# Patient Record
Sex: MALE | Race: WHITE | NOT HISPANIC OR LATINO | Employment: OTHER | URBAN - METROPOLITAN AREA
[De-identification: names, ages, dates, MRNs, and addresses within clinical notes are randomized per-mention and may not be internally consistent; named-entity substitution may affect disease eponyms.]

---

## 2019-09-16 RX ORDER — CLONIDINE HYDROCHLORIDE 0.2 MG/1
1 TABLET ORAL 3 TIMES DAILY
COMMUNITY
Start: 2016-11-15

## 2019-09-16 RX ORDER — VALSARTAN AND HYDROCHLOROTHIAZIDE 160; 12.5 MG/1; MG/1
1 TABLET, FILM COATED ORAL 3 TIMES DAILY
COMMUNITY
Start: 2016-11-13

## 2019-09-16 RX ORDER — AMLODIPINE BESYLATE 10 MG/1
1 TABLET ORAL DAILY
COMMUNITY
Start: 2014-11-20

## 2019-09-18 ENCOUNTER — OFFICE VISIT (OUTPATIENT)
Dept: NEUROSURGERY | Facility: CLINIC | Age: 74
End: 2019-09-18

## 2019-09-18 VITALS
HEART RATE: 65 BPM | WEIGHT: 218.6 LBS | SYSTOLIC BLOOD PRESSURE: 191 MMHG | DIASTOLIC BLOOD PRESSURE: 83 MMHG | HEIGHT: 71 IN | BODY MASS INDEX: 30.6 KG/M2

## 2019-09-18 DIAGNOSIS — M43.16 SPONDYLOLISTHESIS OF LUMBAR REGION: ICD-10-CM

## 2019-09-18 DIAGNOSIS — Z98.890 HX OF MELANOMA EXCISION: ICD-10-CM

## 2019-09-18 DIAGNOSIS — M48.062 SPINAL STENOSIS, LUMBAR REGION, WITH NEUROGENIC CLAUDICATION: Primary | ICD-10-CM

## 2019-09-18 DIAGNOSIS — Z85.820 HX OF MELANOMA EXCISION: ICD-10-CM

## 2019-09-18 PROCEDURE — 99203 OFFICE O/P NEW LOW 30 MIN: CPT | Performed by: PHYSICIAN ASSISTANT

## 2019-09-18 NOTE — PROGRESS NOTES
Subjective   Patient ID: Vinicius Casas is a 74 y.o. male is here today as a self referral for back and left pain.  He denies any recent cause or injury. He has not had any recent treatments. He has recently been using CBD oil which has helped.  Today, he has no back or leg pain. He does have left leg weakness.      Back Pain   The pain is at a severity of 0/10. The patient is experiencing no pain. Associated symptoms include leg pain and weakness. Pertinent negatives include no bladder incontinence, bowel incontinence, numbness, pelvic pain, perianal numbness or tingling.   Leg Pain    The pain is present in the left leg. The pain is at a severity of 0/10. The patient is experiencing no pain. Associated symptoms include muscle weakness. Pertinent negatives include no numbness or tingling.       The following portions of the patient's history were reviewed and updated as appropriate: allergies, current medications, past family history, past medical history, past social history, past surgical history and problem list.    Review of Systems   Gastrointestinal: Negative for bowel incontinence.   Genitourinary: Negative for bladder incontinence, difficulty urinating and pelvic pain.   Musculoskeletal: Positive for back pain (left leg pain).   Neurological: Positive for weakness. Negative for tingling and numbness.   All other systems reviewed and are negative.      Objective   Physical Exam   Constitutional: He is oriented to person, place, and time. He appears well-developed and well-nourished.   HENT:   Head: Normocephalic and atraumatic.   Right Ear: External ear normal.   Left Ear: External ear normal.   Eyes: Conjunctivae and EOM are normal. Pupils are equal, round, and reactive to light. Right eye exhibits no discharge. Left eye exhibits no discharge.   Neck: Normal range of motion. Neck supple. No tracheal deviation present.   Cardiovascular: Intact distal pulses.   Pulmonary/Chest: Effort normal. No stridor. No  respiratory distress.   Musculoskeletal: Normal range of motion. He exhibits no edema, tenderness or deformity.   Neurological: He is alert and oriented to person, place, and time. He has normal strength and normal reflexes. He displays no atrophy, no tremor and normal reflexes. No cranial nerve deficit or sensory deficit. He exhibits normal muscle tone. He displays a negative Romberg sign. He displays no seizure activity. Coordination and gait normal.   No long tract signs    Very mild 5-/5 L ant tib weakness   Skin: Skin is warm and dry.   Psychiatric: He has a normal mood and affect. His behavior is normal. Judgment and thought content normal.   Nursing note and vitals reviewed.    Neurologic Exam     Mental Status   Oriented to person, place, and time.     Cranial Nerves     CN III, IV, VI   Pupils are equal, round, and reactive to light.  Extraocular motions are normal.     Motor Exam     Strength   Strength 5/5 throughout.       Assessment/Plan   Independent Review of Radiographic Studies:      Medical Decision Making:    Mr. Casas was last here in 2015.  He has a history of severe spinal stenosis at L4-L5 and to a lesser degree L3-L4 as well as a history of a spondylolisthesis at L4-L5.  At his last appointment his back and leg pain had improved significantly with the use of ibuprofen.  He recently experienced an exacerbation of back and left leg pain that was quite severe however about a month ago it resolved with an alternating course of aspirin and ibuprofen.  He has since switched to CBD oil and is off the aspirin and ibuprofen and no longer has any back or leg pain.  He had previously also been complaining of some intermittent leg numbness but that has also resolved.  He denies any bowel or bladder incontinence.  He came in today despite the fact that he has no pain because he has noticed increasing leg weakness in the left leg.  It is more of an intermittent giving out but he feels less and less  stable on the leg.  There are not any particular exacerbating or alleviating factors.  He has not had any recent imaging.  His exam does reveal very subtle left tibialis anterior weakness.    He understands that his symptoms, both the sense of leg weakness and intermittent pain, are likely secondary to the known spinal stenosis.  I explained that surgery could be considered for 2 reasons, one for severe radicular pain in the other for weakness.  His pain has thankfully resolved.  His weakness is extremely subtle but he does feel that it is progressing.  I will send him for a new MRI as well as lumbar flexion and extension x-rays and have him follow-up thereafter with Dr. Garrison.  They can discuss whether it makes sense to consider surgery now.  He will also call in the interim with any questions or concerns.  He does have a history of previous melanoma and therefore I will check the MRI with and without contrast.  Vinicius was seen today for back pain and leg pain.    Diagnoses and all orders for this visit:    Spinal stenosis, lumbar region, with neurogenic claudication  -     MRI Lumbar Spine With & Without Contrast; Future  -     XR Spine Lumbar Complete With Flex & Ext; Future    Spondylolisthesis of lumbar region  -     MRI Lumbar Spine With & Without Contrast; Future  -     XR Spine Lumbar Complete With Flex & Ext; Future    Hx of melanoma excision      Return for follow up after radiology test with Dr. Garrison to discuss surgery.

## 2019-10-02 ENCOUNTER — HOSPITAL ENCOUNTER (OUTPATIENT)
Dept: MRI IMAGING | Facility: HOSPITAL | Age: 74
Discharge: HOME | End: 2019-10-02
Admitting: PHYSICIAN ASSISTANT

## 2019-10-02 ENCOUNTER — HOSPITAL ENCOUNTER (OUTPATIENT)
Dept: GENERAL RADIOLOGY | Facility: HOSPITAL | Age: 74
Discharge: HOME | End: 2019-10-02

## 2019-10-02 DIAGNOSIS — M43.16 SPONDYLOLISTHESIS OF LUMBAR REGION: ICD-10-CM

## 2019-10-02 DIAGNOSIS — M48.062 SPINAL STENOSIS, LUMBAR REGION, WITH NEUROGENIC CLAUDICATION: ICD-10-CM

## 2019-10-02 PROCEDURE — 72114 X-RAY EXAM L-S SPINE BENDING: CPT

## 2019-10-02 PROCEDURE — 82565 ASSAY OF CREATININE: CPT

## 2019-10-02 PROCEDURE — A9577 INJ MULTIHANCE: HCPCS | Performed by: PHYSICIAN ASSISTANT

## 2019-10-02 PROCEDURE — 0 GADOBENATE DIMEGLUMINE 529 MG/ML SOLUTION: Performed by: PHYSICIAN ASSISTANT

## 2019-10-02 PROCEDURE — 72158 MRI LUMBAR SPINE W/O & W/DYE: CPT

## 2019-10-02 RX ADMIN — GADOBENATE DIMEGLUMINE 20 ML: 529 INJECTION, SOLUTION INTRAVENOUS at 21:29

## 2019-10-03 LAB — CREAT BLDA-MCNC: 1.5 MG/DL (ref 0.6–1.3)

## 2019-10-10 NOTE — PROGRESS NOTES
Subjective   Patient ID: Vinicius Casas is a 74 y.o. male is here today for follow-up to discuss lumbar MRI and plain film results    Patient was last seen 9.18.19 for left leg weakness.  Patient states that the left leg weakness is worsening.  He has not had low back, leg or numbness since he started using CBD oil in August.    Patient was last seen prior to that appointment in 2015 for leg pain that resolved with NSAIDS    It has been almost 5 years since I last saw this patient. He has known spinal stenosis at L4-L5 with a spondylolisthesis and also some at L3-L4. He used to have some pain but that is better since he has been on CBD oil. What he is most concerned about is a sense of weakness in his left leg in particular. I cannot really detect any focal motor deficits although there may be some very mild left quadriceps weakness. To be clear, he says he is not describing pain but a sense of heaviness in his legs, which is certainly possible with this degree of severe spinal stenosis. He has every really tried any therapy. I encouraged him to try that first. I am not opposed to operating on him for his current symptoms, but if we were to do that, I think it would probably require a decompression and fusion at L4-L5 and perhaps even a decompression at L3-L4 and I would like to avoid that at least initially if we can. He was a bit skeptical but I convinced him to try some therapy and he will come back and see me in 3 months. If he is not any better, we can consider surgical treatment described below.      Extremity Weakness    The pain is present in the left lower leg and left upper leg. The problem occurs constantly. The problem has been gradually worsening. The pain is at a severity of 6/10. Associated symptoms include numbness.       The following portions of the patient's history were reviewed and updated as appropriate: allergies, current medications, past family history, past medical history, past social  history, past surgical history and problem list.    Review of Systems   Musculoskeletal: Positive for extremity weakness and gait problem. Negative for arthralgias, back pain and myalgias.   Neurological: Positive for weakness and numbness.   All other systems reviewed and are negative.      Objective   Physical Exam   Constitutional: He is oriented to person, place, and time. He appears well-developed and well-nourished.   HENT:   Head: Normocephalic and atraumatic.   Eyes: Conjunctivae and EOM are normal. Pupils are equal, round, and reactive to light.   Fundoscopic exam:       The right eye shows no papilledema. The right eye shows venous pulsations.        The left eye shows no papilledema. The left eye shows venous pulsations.   Neck: Carotid bruit is not present.   Neurological: He is oriented to person, place, and time. He has a normal Finger-Nose-Finger Test and a normal Heel to Shin Test. Gait normal.   Reflex Scores:       Tricep reflexes are 2+ on the right side and 2+ on the left side.       Bicep reflexes are 2+ on the right side and 2+ on the left side.       Brachioradialis reflexes are 2+ on the right side and 2+ on the left side.       Patellar reflexes are 2+ on the right side and 2+ on the left side.       Achilles reflexes are 2+ on the right side and 2+ on the left side.  Psychiatric: His speech is normal.     Neurologic Exam     Mental Status   Oriented to person, place, and time.   Registration of memory: Good recent and remote memory.   Attention: normal. Concentration: normal.   Speech: speech is normal   Level of consciousness: alert  Knowledge: consistent with education.     Cranial Nerves     CN II   Visual fields full to confrontation.   Visual acuity: normal    CN III, IV, VI   Pupils are equal, round, and reactive to light.  Extraocular motions are normal.     CN V   Facial sensation intact.   Right corneal reflex: normal  Left corneal reflex: normal    CN VII   Facial expression full,  symmetric.   Right facial weakness: none  Left facial weakness: none    CN VIII   Hearing: intact    CN IX, X   Palate: symmetric    CN XI   Right sternocleidomastoid strength: normal  Left sternocleidomastoid strength: normal    CN XII   Tongue: not atrophic  Tongue deviation: none    Motor Exam   Muscle bulk: normal  Right arm tone: normal  Left arm tone: normal  Right leg tone: normal  Left leg tone: normal    Strength   Strength 5/5 except as noted.     Sensory Exam   Light touch normal.     Gait, Coordination, and Reflexes     Gait  Gait: normal    Coordination   Finger to nose coordination: normal  Heel to shin coordination: normal    Reflexes   Right brachioradialis: 2+  Left brachioradialis: 2+  Right biceps: 2+  Left biceps: 2+  Right triceps: 2+  Left triceps: 2+  Right patellar: 2+  Left patellar: 2+  Right achilles: 2+  Left achilles: 2+  Right : 2+  Left : 2+      Assessment/Plan   Independent Review of Radiographic Studies:    I reviewed the new MRI done recently which did show severe spinal stenosis at L4-L5 with a spondylolisthesis and mild degree of stenosis at L3-L4.  Agree with the report.      Medical Decision Making:    I convinced him to try some therapy at least for a few weeks and to come and see me in 3 months.  If there is actually no progress made we can talk about surgery but he would probably be both decompressed and fused at L4-L5.      Vinicius was seen today for extremity weakness.    Diagnoses and all orders for this visit:    Spinal stenosis, lumbar region, with neurogenic claudication  -     Ambulatory Referral to Physical Therapy Evaluate and treat    Spondylolisthesis of lumbar region  -     Ambulatory Referral to Physical Therapy Evaluate and treat    Left leg weakness  -     Ambulatory Referral to Physical Therapy Evaluate and treat      Return in about 3 months (around 1/23/2020).

## 2019-10-23 ENCOUNTER — OFFICE VISIT (OUTPATIENT)
Dept: NEUROSURGERY | Facility: CLINIC | Age: 74
End: 2019-10-23

## 2019-10-23 VITALS
HEIGHT: 71 IN | DIASTOLIC BLOOD PRESSURE: 78 MMHG | BODY MASS INDEX: 30.52 KG/M2 | HEART RATE: 74 BPM | SYSTOLIC BLOOD PRESSURE: 168 MMHG

## 2019-10-23 DIAGNOSIS — M43.16 SPONDYLOLISTHESIS OF LUMBAR REGION: ICD-10-CM

## 2019-10-23 DIAGNOSIS — M48.062 SPINAL STENOSIS, LUMBAR REGION, WITH NEUROGENIC CLAUDICATION: Primary | ICD-10-CM

## 2019-10-23 DIAGNOSIS — R29.898 LEFT LEG WEAKNESS: ICD-10-CM

## 2019-10-23 PROCEDURE — 99214 OFFICE O/P EST MOD 30 MIN: CPT | Performed by: NEUROLOGICAL SURGERY

## 2019-11-07 ENCOUNTER — HOSPITAL ENCOUNTER (OUTPATIENT)
Dept: PHYSICAL THERAPY | Facility: HOSPITAL | Age: 74
Setting detail: THERAPIES SERIES
Discharge: HOME | End: 2019-11-07

## 2019-11-07 DIAGNOSIS — R29.898 LEFT LEG WEAKNESS: ICD-10-CM

## 2019-11-07 DIAGNOSIS — M48.062 SPINAL STENOSIS, LUMBAR REGION, WITH NEUROGENIC CLAUDICATION: Primary | ICD-10-CM

## 2019-11-07 DIAGNOSIS — M54.50 CHRONIC BILATERAL LOW BACK PAIN, UNSPECIFIED WHETHER SCIATICA PRESENT: ICD-10-CM

## 2019-11-07 DIAGNOSIS — G89.29 CHRONIC BILATERAL LOW BACK PAIN, UNSPECIFIED WHETHER SCIATICA PRESENT: ICD-10-CM

## 2019-11-07 DIAGNOSIS — R53.1 WEAKNESS: ICD-10-CM

## 2019-11-07 PROCEDURE — 97161 PT EVAL LOW COMPLEX 20 MIN: CPT

## 2019-11-07 PROCEDURE — 97110 THERAPEUTIC EXERCISES: CPT

## 2019-11-07 NOTE — THERAPY EVALUATION
Outpatient Physical Therapy Ortho Initial Evaluation  [facility]     Patient Name: Vinicius Casas  : 1945  MRN: [id number]  Today's Date: 2019      Visit Date: 2019    Patient Active Problem List   Diagnosis   • Spinal stenosis, lumbar region, with neurogenic claudication   • Spondylolisthesis of lumbar region   • Hx of melanoma excision   • Left leg weakness        Past Medical History:   Diagnosis Date   • High blood pressure    • Melanoma (CMS/HCC)         No past surgical history on file.    Visit Dx:     ICD-10-CM ICD-9-CM   1. Spinal stenosis, lumbar region, with neurogenic claudication M48.062 724.03   2. Chronic bilateral low back pain, unspecified whether sciatica present M54.5 724.2    G89.29 338.29   3. Weakness R53.1 780.79   4. Left leg weakness R29.898 729.89         Patient History     Row Name 19 1500             History    Chief Complaint  Muscle weakness  -      Type of Pain  Lower Extremity / Leg  -      Date Current Problem(s) Began  -- 15 years w/ gradual worsening  -      Brief Description of Current Complaint  Pt reports he hurt his lumbar spine 15+ years ago leading to B leg pain. States he has been diagnosed with spondylolisthesis and spinal stenosis. He was in a lot of pain back in August of this year in the low back and the legs however began taking CBD oil and feels like it has helped his pain substantially. At this time his major complaint is occasional L LE weakness though states it has become more and more rare. Would like to work on working on decompressing spine, improving core strength, and improving LE strength to see if he can avoid surgery.   -      Previous treatment for THIS PROBLEM  Chiropractor;Medication  -      Patient/Caregiver Goals  Improve mobility;Improve strength;Know what to do to help the symptoms  -      Patient's Rating of General Health  Good  -      What clinical tests have you had for this problem?  X-ray;MRI  -       Results of Clinical Tests  stenosis, spondylolisthesis  -         Pain     Pain Location  Back  -      Pain at Present  0  -      Pain at Best  0  -      Pain at Worst  7 if does not take CBD oil  -      Pain Frequency  Intermittent  -      Pain Description  Burning;Numbness  -      What Performance Factors Make the Current Problem(s) WORSE?  standing, walking if not taking CBD oil  -      What Performance Factors Make the Current Problem(s) BETTER?  sitting, CBD oil  -      Is your sleep disturbed?  No  -      Is medication used to assist with sleep?  No  -      Difficulties at work?  retired  -      Difficulties with ADL's?  standing, walking  -         Fall Risk Assessment    Any falls in the past year:  No  -         Daily Activities    Primary Language  English  -      How does patient learn best?  Listening;Reading;Demonstration;Pictures/Video  -      Teaching needs identified  Home Exercise Program;Management of Condition;Falls Prevention;Home Safety  -      Pt Participated in POC and Goals  Yes  -         Safety    Are you being hurt, hit, or frightened by anyone at home or in your life?  No  -JH      Are you being neglected by a caregiver  No  -        User Key  (r) = Recorded By, (t) = Taken By, (c) = Cosigned By    Initials Name Provider Type     Domonique Pruitt, PT Physical Therapist          PT Ortho     Row Name 11/07/19 1500       Subjective Pain    Able to rate subjective pain?  yes  -    Pre-Treatment Pain Level  0  -JH    Post-Treatment Pain Level  0  -JH       Posture/Observations    Posture/Observations Comments  Increased abdominal weight, relative anterior weight shift at pelvis  -       General ROM    Head/Neck/Trunk  Trunk Extension;Trunk Flexion;Trunk Lt Lateral Flexion;Trunk Rt Lateral Flexion;Trunk Lt Rotation;Trunk Rt Rotation  -       Head/Neck/Trunk    Trunk Extension AROM  50%  -    Trunk Flexion AROM  90%  -    Trunk Lt Lateral  Flexion AROM  75%  -JH    Trunk Rt Lateral Flexion AROM  75%  -JH    Trunk Lt Rotation AROM  75%  -JH    Trunk Rt Rotation AROM  75%  -JH    Head/Neck/Trunk Comments  no pain any direction  -JH       MMT (Manual Muscle Testing)    Rt Lower Ext  Rt Hip Flexion;Rt Hip Extension;Rt Hip ABduction;Rt Hip Internal (Medial) Rotation;Rt Hip External (Lateral) Rotation;Rt Knee Extension;Rt Knee Flexion;Rt Ankle Dorsiflexion;Rt Ankle Plantarflexion  -JH    Lt Lower Ext  Lt Hip Flexion;Lt Hip Extension;Lt Hip ABduction;Lt Hip Internal (Medial) Rotation;Lt Hip External (Lateral) Rotation;Lt Knee Extension;Lt Knee Flexion;Lt Ankle Dorsiflexion;Lt Ankle Plantarflexion  -JH       MMT Right Lower Ext    Rt Hip Flexion MMT, Gross Movement  (4+/5) good plus  -JH    Rt Hip Extension MMT, Gross Movement  (3+/5) fair plus  -JH    Rt Hip ABduction MMT, Gross Movement  (4-/5) good minus  -JH    Rt Hip Internal (Medial) Rotation MMT, Gross Movement  (4+/5) good plus  -JH    Rt Hip External (Lateral) Rotation MMT, Gross Movement  (4+/5) good plus  -JH    Rt Knee Extension MMT, Gross Movement  (4+/5) good plus  -JH    Rt Knee Flexion MMT, Gross Movement  (4+/5) good plus  -JH    Rt Ankle Plantarflexion MMT, Gross Movement  (3+/5) fair plus  -JH    Rt Ankle Dorsiflexion MMT, Gross Movement  (4/5) good  -JH       MMT Left Lower Ext    Lt Hip Flexion MMT, Gross Movement  (4+/5) good plus  -JH    Lt Hip Extension MMT, Gross Movement  (3+/5) fair plus  -JH    Lt Hip ABduction MMT, Gross Movement  (4-/5) good minus  -JH    Lt Hip Internal (Medial) Rotation MMT, Gross Movement  (4+/5) good plus  -JH    Lt Hip External (Lateral) Rotation MMT, Gross Movement  (4+/5) good plus  -JH    Lt Knee Extension MMT, Gross Movement  (4+/5) good plus  -JH    Lt Knee Flexion MMT, Gross Movement  (4+/5) good plus  -JH    Lt Ankle Plantarflexion MMT, Gross Movement  (3+/5) fair plus  -JH    Lt Ankle Dorsiflexion MMT, Gross Movement  (4/5) good  -JH       Sensation     Sensation WNL?  WNL  -       Balance Skills Training    SLS  R LE 10 seconds, L LE 3 seconds  -      User Key  (r) = Recorded By, (t) = Taken By, (c) = Cosigned By    Initials Name Provider Type    Domonique Walton, PT Physical Therapist                      Therapy Education  Education Details: role of OP PT, nature of condition, anatomy, HEP w/ expected response to exercise  Given: HEP, Symptoms/condition management, Pain management, Posture/body mechanics, Mobility training  Program: New  How Provided: Verbal, Written, Demonstration  Provided to: Patient  Level of Understanding: Teach back education performed, Verbalized, Demonstrated     PT OP Goals     Row Name 11/07/19 1600          PT Short Term Goals    STG Date to Achieve  12/07/19  -     STG 1  Patient will be independent with education for symptom management and initial HEP  -     STG 1 Progress  New  [facility]     STG 2  Pt will demonstrate improved resting standing posture, no longer resting in anteriorly shifted pelvis to reduce stress on lumbar spine  -     STG 2 Progress  [facility]     STG 3  Pt will cont to report pain levels <2/10 throughout treatment  -     STG 3 Progress  [facility]        Long Term Goals    LTG Date to Achieve  01/06/20  -     LTG 1  Pt will be independent w/ advanced HEP to maintain improved postures and reduce liklihood of re-irritation of tissue.  -     LTG 1 Progress  New  [facility]     LTG 2  Pt will improve B LE strength to at least 5-/5.  -     LTG 2 Progress  New  [facility]     LTG 3  Pt will demonstrate improve SLS balance Bilaterally to 15 seconds  -     LTG 3 Progress  New  [facility]     LTG 4  Pt will demo improvement in modified oswestry score from 46% disability to 35% to demonstrate improvement in functional capacity  -     LTG 4 Progress  New  [facility]     LTG 5  --  -     LT 5 Progress  --  -        Time Calculation    PT Goal Re-Cert Due Date  02/05/20  -       User Key  (r) = Recorded By, (t) = Taken By,  (c) = Cosigned By    Initials Name Provider Type    Domonique Walton, PT Physical Therapist          PT Assessment/Plan     Row Name 11/07/19 2914          PT Assessment    Functional Limitations  Limitations in functional capacity and performance;Performance in leisure activities;Limitation in home management;Limitations in community activities;Impaired gait  -     Impairments  Balance;Gait;Pain;Joint mobility;Range of motion;Muscle strength;Posture;Poor body mechanics  -     Assessment Comments  Vinicius Casas is a 74 y.o. male referred to physical therapy for spinal stenosis of the lumbar spine. He presents with a stable clinical presentation, along with no remarkable comorbidities or personal factors that may impact his progress in the plan of care. Pt presents today with LE weakness, reduced single leg balance L>R, poor postural awareness with anteriorly shifted weight increased extension on lumbar region, and reduced lumbar and thoracic mobility . his signs and symptoms are consistent with referring diagnosis. The previous impairments limit his ability to walk, or stand w/o increased pain or weakness if he does not take medication first. Pt will benefit from skilled PT to address the previous impairments and return to [facility].  -     Please refer to paper survey for additional self-reported information  Yes  -     Rehab Potential  Good  -     Patient/caregiver participated in establishment of treatment plan and goals  Yes  -     Patient would benefit from skilled therapy intervention  Yes  -        PT Plan    PT Frequency  2x/week  -     Predicted Duration of Therapy Intervention (Therapy Eval)  4-6 wks  -     Planned CPT's?  PT EVAL LOW COMPLEXITY: 16740;PT RE-EVAL: 64108;PT THER ACT EA 15 MIN: 53953;PT NEUROMUSC RE-EDUCATION EA 15 MIN: 16028;PT ELECTRICAL STIM UNATTEND: ;PT TRACTION LUMBAR: 23467;PT HOT OR COLD PACK TREAT [facility]ARE;PT GAIT TRAINING EA 15 MIN: 72625;PT MANUAL THERAPY EA  15 MIN: 75402;PT THER PROC EA 15 MIN: 95810  -     Physical Therapy Interventions (Optional Details)  balance training;gait training;gross motor skills;home exercise program;joint mobilization;lumbar stabilization;neuromuscular re-education;motor coordination training;modalities;manual therapy techniques;patient/family education;postural re-education;ROM (Range of Motion);strengthening;stretching;transfer training;dry needling  -     PT Plan Comments  assess response and compliance to HEP, progress core/hip strengthening as hiro, encourage posterior weight shift in posture to reduce stress to lumbar spine  -       User Key  (r) = Recorded By, (t) = Taken By, (c) = Cosigned By    Initials Name Provider Type     Domonique Pruitt, PT Physical Therapist            OP Exercises     Row Name 11/07/19 1500             Subjective Pain    Able to rate subjective pain?  yes  -      Pre-Treatment Pain Level  0  -JH      Post-Treatment Pain Level  0  -         Total Minutes    87900 - PT Therapeutic Exercise Minutes  9  -JH         Exercise 1    Exercise Name 1  ppt  -      Reps 1  10  -JH      Time 1  5  -JH         Exercise 2    Exercise Name 2  SKTC w/ opp leg extended  -      Reps 2  3  -JH      Time 2  20 B  -         Exercise 3    Exercise Name 3  consider posterior weight shifting balance activity, DKTC with ball, HS stretch, hip flexor stretch, nustep, clamshells, ppt w/ small bridge to tolerance  -        User Key  (r) = Recorded By, (t) = Taken By, (c) = Cosigned By    Initials Name Provider Type     Domonique Pruitt, PT Physical Therapist                        Outcome Measure Options: Modifed Owestry  Modified Oswestry  Modified Oswestry Score/Comments: 46% disability      Time Calculation:     Start Time: 1530  Stop Time: 1615  Time Calculation (min): 45 min     Therapy Charges for Today     Code Description Service Date Service Provider Modifiers Qty    [id number]  PT EVAL LOW COMPLEXITY 2  11/7/2019 Domonique Pruitt, PT GP 1    [id number] HC PT THER PROC EA 15 MIN 11/7/2019 Domonique Pruitt, PT GP 1          PT G-Codes  Outcome Measure Options: Modifed Daniely  Modified Oswestry Score/Comments: 46% disability         Domonique Pruitt, PT  11/7/2019

## 2019-11-11 ENCOUNTER — HOSPITAL ENCOUNTER (OUTPATIENT)
Dept: PHYSICAL THERAPY | Facility: HOSPITAL | Age: 74
Setting detail: THERAPIES SERIES
Discharge: HOME | End: 2019-11-11

## 2019-11-11 DIAGNOSIS — M54.50 CHRONIC BILATERAL LOW BACK PAIN, UNSPECIFIED WHETHER SCIATICA PRESENT: ICD-10-CM

## 2019-11-11 DIAGNOSIS — M48.062 SPINAL STENOSIS, LUMBAR REGION, WITH NEUROGENIC CLAUDICATION: Primary | ICD-10-CM

## 2019-11-11 DIAGNOSIS — R53.1 WEAKNESS: ICD-10-CM

## 2019-11-11 DIAGNOSIS — G89.29 CHRONIC BILATERAL LOW BACK PAIN, UNSPECIFIED WHETHER SCIATICA PRESENT: ICD-10-CM

## 2019-11-11 DIAGNOSIS — R29.898 LEFT LEG WEAKNESS: ICD-10-CM

## 2019-11-11 PROCEDURE — 97110 THERAPEUTIC EXERCISES: CPT | Performed by: PHYSICAL THERAPIST

## 2019-11-11 NOTE — THERAPY TREATMENT NOTE
Outpatient Physical Therapy Ortho Treatment Note  [facility]     Patient Name: Vinicius Casas  : 1945  MRN: [id number]  Today's Date: 2019      Visit Date: 2019    Visit Dx:    ICD-10-CM ICD-9-CM   1. Spinal stenosis, lumbar region, with neurogenic claudication M48.062 724.03   2. Chronic bilateral low back pain, unspecified whether sciatica present M54.5 724.2    G89.29 338.29   3. Weakness R53.1 780.79   4. Left leg weakness R29.898 729.89       Patient Active Problem List   Diagnosis   • Spinal stenosis, lumbar region, with neurogenic claudication   • Spondylolisthesis of lumbar region   • Hx of melanoma excision   • Left leg weakness        Past Medical History:   Diagnosis Date   • High blood pressure    • Melanoma (CMS/HCC)         No past surgical history on file.                    PT Assessment/Plan     Row Name 19 1630          PT Assessment    Assessment Comments  First session since initial evaluation. Reports compliance with SKTC stretch. Added core stabilization exercises and LE stretching exercises. Verbal and tactile cueing to ensure proper understanding. Handouts given to add to HEP. Will benefit from progressive LE strengthening as well in future sessions.    -        PT Plan    PT Plan Comments  add LAQ, HS curls and/or leg press in future sessions  -       User Key  (r) = Recorded By, (t) = Taken By, (c) = Cosigned By    Initials Name Provider Type    Isaiah Daniels, PT Physical Therapist            OP Exercises     Row Name 19 1400             Subjective Comments    Subjective Comments  No change since initial evaluation. Pain in L thigh, inside.  -         Subjective Pain    Able to rate subjective pain?  yes  -      Pre-Treatment Pain Level  3  -      Post-Treatment Pain Level  3  -         Total Minutes    05641 - PT Therapeutic Exercise Minutes  42  -         Exercise 1    Exercise Name 1  Nustep, L4  -      Time 1  5 minutes  -       Additional Comments  BLE only  -CJ         Exercise 2    Exercise Name 2  PPT with TA  -CJ      Reps 2  15  -CJ      Time 2  5 sec  -CJ         Exercise 3    Exercise Name 3  calf stretch  -CJ      Reps 3  3  -CJ      Time 3  20 sec  -CJ         Exercise 4    Exercise Name 4  B Hamstring stretch  -CJ      Reps 4  2  -CJ      Time 4  30sec  -CJ         Exercise 5    Exercise Name 5  H/L hip abduction  -CJ      Reps 5  20  -CJ      Additional Comments  RTB  -CJ         Exercise 6    Exercise Name 6  LTR with TA; 10-2  -CJ      Reps 6  15  -CJ         Exercise 7    Exercise Name 7  Bridges  -CJ      Reps 7  12  -CJ      Additional Comments  slow and controlled  -CJ         Exercise 8    Exercise Name 8  SKTC  -CJ      Reps 8  2  -CJ      Time 8  30sec  -CJ         Exercise 9    Exercise Name 9  DKTC with TA and green ball  -CJ      Reps 9  15  -CJ         Exercise 10    Exercise Name 10  B piriformis stretch  -CJ      Reps 10  2  -CJ      Time 10  30 SEC  -CJ        User Key  (r) = Recorded By, (t) = Taken By, (c) = Cosigned By    Initials Name Provider Type     Isaiah Calderón, PT Physical Therapist                       PT OP Goals     Row Name 11/11/19 1600          PT Short Term Goals    STG Date to Achieve  12/07/19  -     STG 1  Patient will be independent with education for symptom management and initial HEP  -     STG 1 Progress  Ongoing  -     STG 2  Pt will demonstrate improved resting standing posture, no longer resting in anteriorly shifted pelvis to reduce stress on lumbar spine  -     STG 2 Progress  Ongoing  -     STG 3  Pt will cont to report pain levels <2/10 throughout treatment  -     STG 3 Progress  Ongoing  -        Long Term Goals    LTG Date to Achieve  01/06/20  -     LTG 1  Pt will be independent w/ advanced HEP to maintain improved postures and reduce liklihood of re-irritation of tissue.  -     LTG 1 Progress  Ongoing  -     LTG 2  Pt will improve B LE strength to at  least 5-/5.  -     LTG 2 Progress  Ongoing  -     LTG 3  Pt will demonstrate improve SLS balance Bilaterally to 15 seconds  -     LTG 3 Progress  Ongoing  -     LTG 4  Pt will demo improvement in modified oswestry score from 46% disability to 35% to demonstrate improvement in functional capacity  -     LTG 4 Progress  Ongoing  -       User Key  (r) = Recorded By, (t) = Taken By, (c) = Cosigned By    Initials Name Provider Type    Isaiah Daniels, PT Physical Therapist                         Time Calculation:   Start Time: 1448  Stop Time: 1530  Time Calculation (min): 42 min  Therapy Charges for Today     Code Description Service Date Service Provider Modifiers Qty    19529374777 HC PT THER PROC EA 15 MIN 11/11/2019 Isaiah Calderón, PT GP 3                    Isaiah Calderón PT  11/11/2019

## 2019-11-13 ENCOUNTER — HOSPITAL ENCOUNTER (OUTPATIENT)
Dept: PHYSICAL THERAPY | Facility: HOSPITAL | Age: 74
Setting detail: THERAPIES SERIES
Discharge: HOME | End: 2019-11-13

## 2019-11-13 DIAGNOSIS — G89.29 CHRONIC BILATERAL LOW BACK PAIN, UNSPECIFIED WHETHER SCIATICA PRESENT: ICD-10-CM

## 2019-11-13 DIAGNOSIS — M48.062 SPINAL STENOSIS, LUMBAR REGION, WITH NEUROGENIC CLAUDICATION: Primary | ICD-10-CM

## 2019-11-13 DIAGNOSIS — R53.1 WEAKNESS: ICD-10-CM

## 2019-11-13 DIAGNOSIS — R29.898 LEFT LEG WEAKNESS: ICD-10-CM

## 2019-11-13 DIAGNOSIS — M54.50 CHRONIC BILATERAL LOW BACK PAIN, UNSPECIFIED WHETHER SCIATICA PRESENT: ICD-10-CM

## 2019-11-13 PROCEDURE — 97110 THERAPEUTIC EXERCISES: CPT | Performed by: PHYSICAL THERAPIST

## 2019-11-13 NOTE — THERAPY TREATMENT NOTE
Outpatient Physical Therapy Ortho Treatment Note  [facility]     Patient Name: Vinicius Casas  : 1945  MRN: [id number]  Today's Date: 2019      Visit Date: 2019    Visit Dx:    ICD-10-CM ICD-9-CM   1. Spinal stenosis, lumbar region, with neurogenic claudication M48.062 724.03   2. Chronic bilateral low back pain, unspecified whether sciatica present M54.5 724.2    G89.29 338.29   3. Weakness R53.1 780.79   4. Left leg weakness R29.898 729.89       Patient Active Problem List   Diagnosis   • Spinal stenosis, lumbar region, with neurogenic claudication   • Spondylolisthesis of lumbar region   • Hx of melanoma excision   • Left leg weakness        Past Medical History:   Diagnosis Date   • High blood pressure    • Melanoma (CMS/HCC)         No past surgical history on file.                    PT Assessment/Plan     Row Name 19 1620          PT Assessment    Assessment Comments  Continued with strengthening program adding calf raises, HS curls, LAQ, and standing hip abduction. No immediate increase in pain/discomfort reported. Fatigue noted with session today. Will monitor symptoms and progress accordingly in future sessions.   -CJ        PT Plan    PT Plan Comments  add leg press in future sessions  -       User Key  (r) = Recorded By, (t) = Taken By, (c) = Cosigned By    Initials Name Provider Type    Isaiah Daniels, PT Physical Therapist            OP Exercises     Row Name 19 1400             Subjective Comments    Subjective Comments  Stiff this AM. L leg not working well yesterday  -         Subjective Pain    Able to rate subjective pain?  yes  -CJ      Pre-Treatment Pain Level  3  -CJ      Post-Treatment Pain Level  3  -         Total Minutes    79506 - PT Therapeutic Exercise Minutes  45  -CJ         Exercise 1    Exercise Name 1  Nustep, L4  -CJ      Time 1  5 minutes  -      Additional Comments  BLE only  -         Exercise 2    Exercise Name 2  PPT with  TA  -CJ      Reps 2  15  -CJ      Time 2  5 sec  -CJ         Exercise 3    Exercise Name 3  calf stretch  -CJ      Reps 3  3  -CJ      Time 3  30 sec  -CJ         Exercise 4    Exercise Name 4  B Hamstring stretch  -CJ      Reps 4  2  -CJ      Time 4  30sec  -CJ         Exercise 5    Exercise Name 5  H/L hip abduction  -CJ      Reps 5  20  -CJ         Exercise 6    Exercise Name 6  LTR with TA; 10-2  -CJ      Reps 6  15  -CJ      Additional Comments  BLE over therapy ball  -CJ         Exercise 7    Exercise Name 7  Bridges  -CJ      Reps 7  15  -CJ      Additional Comments  slow and controlled  -CJ         Exercise 8    Exercise Name 8  SKTC  -CJ      Reps 8  2  -CJ      Time 8  30sec  -CJ         Exercise 9    Exercise Name 9  DKTC with TA and green ball  -CJ      Reps 9  15  -CJ         Exercise 10    Exercise Name 10  B piriformis stretch  -CJ      Reps 10  2  -CJ      Time 10  30 SEC  -CJ         Exercise 11    Exercise Name 11  calf raises  -CJ      Reps 11  20  -CJ         Exercise 12    Exercise Name 12  B LAQ, #2  -CJ      Reps 12  15  -CJ         Exercise 13    Exercise Name 13  B HS curls, #2  -CJ      Reps 13  20  -CJ         Exercise 14    Exercise Name 14  B hip abduction  -CJ      Reps 14  15  -CJ      Additional Comments  #2  -CJ        User Key  (r) = Recorded By, (t) = Taken By, (c) = Cosigned By    Initials Name Provider Type    CJ Isaiah Calderón, PT Physical Therapist                                          Time Calculation:   Start Time: 1400  Stop Time: 1445  Time Calculation (min): 45 min  Total Timed Code Minutes- PT: 45 minute(s)  Therapy Charges for Today     Code Description Service Date Service Provider Modifiers Qty    10648725884 HC PT THER PROC EA 15 MIN 11/13/2019 Isaiah Calderón, PT GP 3                    Isaiah Calderón PT  11/13/2019

## 2019-11-18 ENCOUNTER — HOSPITAL ENCOUNTER (OUTPATIENT)
Dept: PHYSICAL THERAPY | Facility: HOSPITAL | Age: 74
Setting detail: THERAPIES SERIES
Discharge: HOME | End: 2019-11-18

## 2019-11-18 DIAGNOSIS — M48.062 SPINAL STENOSIS, LUMBAR REGION, WITH NEUROGENIC CLAUDICATION: Primary | ICD-10-CM

## 2019-11-18 DIAGNOSIS — M54.50 CHRONIC BILATERAL LOW BACK PAIN, UNSPECIFIED WHETHER SCIATICA PRESENT: ICD-10-CM

## 2019-11-18 DIAGNOSIS — R29.898 LEFT LEG WEAKNESS: ICD-10-CM

## 2019-11-18 DIAGNOSIS — R53.1 WEAKNESS: ICD-10-CM

## 2019-11-18 DIAGNOSIS — G89.29 CHRONIC BILATERAL LOW BACK PAIN, UNSPECIFIED WHETHER SCIATICA PRESENT: ICD-10-CM

## 2019-11-18 PROCEDURE — 97110 THERAPEUTIC EXERCISES: CPT | Performed by: PHYSICAL THERAPIST

## 2019-11-18 NOTE — THERAPY TREATMENT NOTE
"    Outpatient Physical Therapy Ortho Treatment Note  [facility]     Patient Name: Vinicius Casas  : 1945  MRN: [id number]  Today's Date: 2019      Visit Date: 2019    Visit Dx:    ICD-10-CM ICD-9-CM   1. Spinal stenosis, lumbar region, with neurogenic claudication M48.062 724.03   2. Chronic bilateral low back pain, unspecified whether sciatica present M54.5 724.2    G89.29 338.29   3. Weakness R53.1 780.79   4. Left leg weakness R29.898 729.89       Patient Active Problem List   Diagnosis   • Spinal stenosis, lumbar region, with neurogenic claudication   • Spondylolisthesis of lumbar region   • Hx of melanoma excision   • Left leg weakness        Past Medical History:   Diagnosis Date   • High blood pressure    • Melanoma (CMS/HCC)         No past surgical history on file.                    PT Assessment/Plan     Row Name 19 1537          PT Assessment    Assessment Comments  Progressed LE weights to 3 lbs without immediate issue. Also added resisted sidestepping and eccentric calf raises. Antalgic gait pattern still observed. Reports of \"decreased groin pain\" since initiating sessions.   -CJ        PT Plan    PT Plan Comments  add leg press  -CJ       User Key  (r) = Recorded By, (t) = Taken By, (c) = Cosigned By    Initials Name Provider Type    Isaiah Daniels, PT Physical Therapist            OP Exercises     Row Name 19 1400             Subjective Comments    Subjective Comments  I have noticed improvements in the L groin pain since starting.  -         Subjective Pain    Able to rate subjective pain?  yes  -CJ      Pre-Treatment Pain Level  2  -CJ      Post-Treatment Pain Level  2  -CJ         Total Minutes    49534 - PT Therapeutic Exercise Minutes  45  -CJ         Exercise 1    Exercise Name 1  Nustep, L4  -CJ      Time 1  5 minutes  -CJ         Exercise 2    Exercise Name 2  sidestepping, resisted  -CJ      Reps 2  3 laps  -CJ      Additional Comments  RTB  -CJ   "       Exercise 3    Exercise Name 3  calf stretch  -CJ      Reps 3  2  -CJ      Time 3  30 sec  -CJ         Exercise 4    Exercise Name 4  B Hamstring stretch  -CJ      Reps 4  3  -CJ      Time 4  30sec  -CJ         Exercise 5    Exercise Name 5  H/L hip abduction  -CJ      Reps 5  20  -CJ      Additional Comments  GTB  -CJ         Exercise 6    Exercise Name 6  LTR with TA; 10-2  -CJ      Reps 6  15  -CJ      Additional Comments  BLE over therapy ball  -CJ         Exercise 7    Exercise Name 7  Bridges  -CJ      Reps 7  15  -CJ      Additional Comments  slow and controlled  -CJ         Exercise 8    Exercise Name 8  SKTC  -CJ      Reps 8  1  -CJ      Time 8  60 sec  -CJ         Exercise 9    Exercise Name 9  DKTC with TA and green ball  -CJ      Reps 9  15  -CJ         Exercise 10    Exercise Name 10  B piriformis stretch  -CJ      Reps 10  2  -CJ      Time 10  30 SEC  -CJ         Exercise 11    Exercise Name 11  calf raises; 3 sec eccentric lowering  -CJ      Reps 11  20  -CJ         Exercise 12    Exercise Name 12  B LAQ, #3  -CJ      Reps 12  15  -CJ      Time 12  5 sec  -CJ         Exercise 13    Exercise Name 13  B HS curls, #3  -CJ      Reps 13  20  -CJ         Exercise 14    Exercise Name 14  B hip abduction  -CJ      Reps 14  15  -CJ      Additional Comments  #3  -CJ        User Key  (r) = Recorded By, (t) = Taken By, (c) = Cosigned By    Initials Name Provider Type    CJ Isaiah Calderón, PT Physical Therapist                                          Time Calculation:   Start Time: 1445  Stop Time: 1530  Time Calculation (min): 45 min  Total Timed Code Minutes- PT: 45 minute(s)  Therapy Charges for Today     Code Description Service Date Service Provider Modifiers Qty    [id number]  PT THER PROC EA 15 MIN 11/18/2019 Isaiah Calderón, PT GP 3                    Isaiah Calderón PT  11/18/2019

## 2019-11-20 ENCOUNTER — HOSPITAL ENCOUNTER (OUTPATIENT)
Dept: PHYSICAL THERAPY | Facility: HOSPITAL | Age: 74
Setting detail: THERAPIES SERIES
Discharge: HOME | End: 2019-11-20

## 2019-11-20 DIAGNOSIS — R53.1 WEAKNESS: ICD-10-CM

## 2019-11-20 DIAGNOSIS — G89.29 CHRONIC BILATERAL LOW BACK PAIN, UNSPECIFIED WHETHER SCIATICA PRESENT: ICD-10-CM

## 2019-11-20 DIAGNOSIS — M48.062 SPINAL STENOSIS, LUMBAR REGION, WITH NEUROGENIC CLAUDICATION: Primary | ICD-10-CM

## 2019-11-20 DIAGNOSIS — M54.50 CHRONIC BILATERAL LOW BACK PAIN, UNSPECIFIED WHETHER SCIATICA PRESENT: ICD-10-CM

## 2019-11-20 DIAGNOSIS — R29.898 LEFT LEG WEAKNESS: ICD-10-CM

## 2019-11-20 PROCEDURE — 97110 THERAPEUTIC EXERCISES: CPT | Performed by: PHYSICAL THERAPIST

## 2019-11-20 NOTE — THERAPY TREATMENT NOTE
"    Outpatient Physical Therapy Ortho Treatment Note  [facility]     Patient Name: Vinicius Casas  : 1945  MRN: [id number]  Today's Date: 2019      Visit Date: 2019    Visit Dx:    ICD-10-CM ICD-9-CM   1. Spinal stenosis, lumbar region, with neurogenic claudication M48.062 724.03   2. Chronic bilateral low back pain, unspecified whether sciatica present M54.5 724.2    G89.29 338.29   3. Weakness R53.1 780.79   4. Left leg weakness R29.898 729.89       Patient Active Problem List   Diagnosis   • Spinal stenosis, lumbar region, with neurogenic claudication   • Spondylolisthesis of lumbar region   • Hx of melanoma excision   • Left leg weakness        Past Medical History:   Diagnosis Date   • High blood pressure    • Melanoma (CMS/HCC)         No past surgical history on file.                    PT Assessment/Plan     Row Name 19 1609          PT Assessment    Assessment Comments  Patient performing all exercises without increase in LE symptoms. No reports of noticing \"strength gains\" to date. Added leg press and progressing resistance.   -CJ       User Key  (r) = Recorded By, (t) = Taken By, (c) = Cosigned By    Initials Name Provider Type    Isaiah Daniels, PT Physical Therapist            OP Exercises     Row Name 19 1500             Subjective Comments    Subjective Comments  No change  -CJ         Subjective Pain    Able to rate subjective pain?  yes  -CJ      Pre-Treatment Pain Level  0  -CJ      Post-Treatment Pain Level  0  -CJ         Total Minutes    75226 - PT Therapeutic Exercise Minutes  45  -CJ         Exercise 1    Exercise Name 1  Nustep, L5  -CJ      Time 1  6 min  -CJ         Exercise 2    Exercise Name 2  sidestepping, resisted  -CJ      Reps 2  3 laps  -CJ      Additional Comments  GTB  -CJ         Exercise 3    Exercise Name 3  calf stretch  -CJ      Reps 3  2  -CJ      Time 3  30 sec  -CJ         Exercise 4    Exercise Name 4  B Hamstring stretch  -CJ      " Reps 4  2  -CJ      Time 4  30sec  -CJ         Exercise 5    Exercise Name 5  H/L hip abduction  -CJ      Reps 5  20  -CJ      Additional Comments  GTB  -CJ         Exercise 6    Exercise Name 6  LTR with TA; 10-2  -CJ      Reps 6  15  -CJ      Additional Comments  BLE over therapy ball  -CJ         Exercise 7    Exercise Name 7  Bridges  -CJ      Reps 7  15  -CJ         Exercise 8    Exercise Name 8  Leg Press  -CJ      Reps 8  20  -CJ      Additional Comments  100#  -CJ         Exercise 9    Exercise Name 9  DKTC with TA and green ball  -CJ      Reps 9  15  -CJ         Exercise 10    Exercise Name 10  B piriformis stretch  -CJ      Reps 10  2  -CJ      Time 10  30 SEC  -CJ         Exercise 11    Exercise Name 11  calf raises; 3 sec eccentric lowering  -CJ      Reps 11  20  -CJ         Exercise 12    Exercise Name 12  B LAQ, #3  -CJ      Reps 12  15  -CJ      Time 12  5 sec  -CJ         Exercise 13    Exercise Name 13  B HS curls, #3  -CJ      Reps 13  20  -CJ         Exercise 14    Exercise Name 14  B hip abduction  -CJ      Reps 14  15  -CJ      Additional Comments  #3  -CJ        User Key  (r) = Recorded By, (t) = Taken By, (c) = Cosigned By    Initials Name Provider Type     Isaiah Calderón, PT Physical Therapist                                          Time Calculation:   Start Time: 1515  Stop Time: 1600  Time Calculation (min): 45 min  Total Timed Code Minutes- PT: 45 minute(s)  Therapy Charges for Today     Code Description Service Date Service Provider Modifiers Qty    22176817228  PT THER PROC EA 15 MIN 11/20/2019 Isaiah Calderón, PT GP 3                    Isaiah Calderón PT  11/20/2019

## 2019-11-25 ENCOUNTER — HOSPITAL ENCOUNTER (OUTPATIENT)
Dept: PHYSICAL THERAPY | Facility: HOSPITAL | Age: 74
Setting detail: THERAPIES SERIES
Discharge: HOME | End: 2019-11-25

## 2019-11-25 DIAGNOSIS — M54.50 CHRONIC BILATERAL LOW BACK PAIN, UNSPECIFIED WHETHER SCIATICA PRESENT: ICD-10-CM

## 2019-11-25 DIAGNOSIS — G89.29 CHRONIC BILATERAL LOW BACK PAIN, UNSPECIFIED WHETHER SCIATICA PRESENT: ICD-10-CM

## 2019-11-25 DIAGNOSIS — M48.062 SPINAL STENOSIS, LUMBAR REGION, WITH NEUROGENIC CLAUDICATION: Primary | ICD-10-CM

## 2019-11-25 DIAGNOSIS — R53.1 WEAKNESS: ICD-10-CM

## 2019-11-25 DIAGNOSIS — R29.898 LEFT LEG WEAKNESS: ICD-10-CM

## 2019-11-25 PROCEDURE — 97110 THERAPEUTIC EXERCISES: CPT | Performed by: PHYSICAL THERAPIST

## 2019-11-25 NOTE — THERAPY TREATMENT NOTE
Outpatient Physical Therapy Ortho Treatment Note  [facility]     Patient Name: Vinicius Casas  : 1945  MRN: [id number]  Today's Date: 2019      Visit Date: 2019    Visit Dx:    ICD-10-CM ICD-9-CM   1. Spinal stenosis, lumbar region, with neurogenic claudication M48.062 724.03   2. Chronic bilateral low back pain, unspecified whether sciatica present M54.5 724.2    G89.29 338.29   3. Weakness R53.1 780.79   4. Left leg weakness R29.898 729.89       Patient Active Problem List   Diagnosis   • Spinal stenosis, lumbar region, with neurogenic claudication   • Spondylolisthesis of lumbar region   • Hx of melanoma excision   • Left leg weakness        Past Medical History:   Diagnosis Date   • High blood pressure    • Melanoma (CMS/HCC)         No past surgical history on file.                    PT Assessment/Plan     Row Name 19 1118          PT Assessment    Assessment Comments  Progressed to use of 4lb ankle weights without difficulty observed or increased pain reported. Verbal cueing to perform exercises with slow and controlled form. Plan to add balance work in next session  -        PT Plan    PT Plan Comments  add stability pad work and AR with resistance band  -       User Key  (r) = Recorded By, (t) = Taken By, (c) = Cosigned By    Initials Name Provider Type    Isaiah Daniels, PT Physical Therapist            OP Exercises     Row Name 19 1000             Subjective Comments    Subjective Comments  My muscles didnt want to work this past weekend.  -CJ         Subjective Pain    Able to rate subjective pain?  yes  -CJ      Pre-Treatment Pain Level  0  -CJ      Post-Treatment Pain Level  0  -CJ         Total Minutes    26716 - PT Therapeutic Exercise Minutes  45  -CJ         Exercise 1    Exercise Name 1  Nustep, L5  -CJ      Time 1  6 min  -CJ         Exercise 2    Exercise Name 2  sidestepping, resisted  -CJ      Reps 2  3 laps  -CJ      Additional Comments  GTB   -CJ         Exercise 3    Exercise Name 3  calf stretch  -CJ      Reps 3  3  -CJ      Time 3  30 sec  -CJ         Exercise 4    Exercise Name 4  B Hamstring stretch  -CJ      Reps 4  2  -CJ      Time 4  30sec  -CJ         Exercise 5    Exercise Name 5  H/L hip abduction  -CJ      Reps 5  20  -CJ      Additional Comments  BTB  -CJ         Exercise 6    Exercise Name 6  LTR with TA; 10-2  -CJ      Reps 6  15  -CJ      Additional Comments  BLE over therapy ball  -CJ         Exercise 7    Exercise Name 7  Bridges with adduction  -CJ      Reps 7  15  -CJ      Additional Comments  slow and controlled  -CJ         Exercise 8    Exercise Name 8  Leg Press  -CJ      Reps 8  20  -CJ      Additional Comments  #115  -CJ         Exercise 9    Exercise Name 9  DKTC with TA and green ball  -CJ      Reps 9  15  -CJ         Exercise 10    Exercise Name 10  B piriformis stretch  -CJ      Reps 10  2  -CJ      Time 10  30 SEC  -CJ         Exercise 11    Exercise Name 11  calf raises; 3 sec eccentric lowering  -CJ      Reps 11  20  -CJ         Exercise 12    Exercise Name 12  B LAQ, #4  -CJ      Reps 12  15  -CJ      Time 12  5 sec  -CJ         Exercise 13    Exercise Name 13  B HS curls, #4  -CJ      Reps 13  20  -CJ         Exercise 14    Exercise Name 14  B hip abduction  -CJ      Reps 14  15  -CJ      Additional Comments  #4  -CJ         Exercise 15    Exercise Name 15  *Anti-rotation with band for core next session  -        User Key  (r) = Recorded By, (t) = Taken By, (c) = Cosigned By    Initials Name Provider Type    Isaiah Daniels S, PT Physical Therapist                       PT OP Goals     Row Name 11/25/19 1100          PT Short Term Goals    STG Date to Achieve  12/07/19  -     STG 1  Patient will be independent with education for symptom management and initial HEP  -CJ     STG 1 Progress  Met  -     STG 2  Pt will demonstrate improved resting standing posture, no longer resting in anteriorly shifted pelvis to  reduce stress on lumbar spine  -     STG 2 Progress  Ongoing  [facility]     STG 3  Pt will cont to report pain levels <2/10 throughout treatment  -     STG 3 Progress  [facility]        Long Term Goals    LTG Date to Achieve  01/06/20  -     LTG 1  Pt will be independent w/ advanced HEP to maintain improved postures and reduce liklihood of re-irritation of tissue.  -     LTG 1 Progress  Ongoing  [facility]     LTG 2  Pt will improve B LE strength to at least 5-/5.  -     LTG 2 Progress  Ongoing  [facility]     LTG 3  Pt will demonstrate improve SLS balance Bilaterally to 15 seconds  -     LTG 3 Progress  Ongoing  [facility]     LTG 4  Pt will demo improvement in modified oswestry score from 46% disability to 35% to demonstrate improvement in functional capacity  -     LT 4 Progress  [facility]       User Key  (r) = Recorded By, (t) = Taken By, (c) = Cosigned By    Initials Name Provider Type    Isaiah Daniels, PT Physical Therapist                         Time Calculation:      Therapy Charges for Today     Code Description Service Date Service Provider Modifiers Qty    [id number]  PT THER PROC EA 15 MIN 11/25/2019 Isaiah Calderón, PT GP 3                    Isaiah Calderón PT  11/25/2019

## 2019-11-27 ENCOUNTER — HOSPITAL ENCOUNTER (OUTPATIENT)
Dept: PHYSICAL THERAPY | Facility: HOSPITAL | Age: 74
Setting detail: THERAPIES SERIES
Discharge: HOME | End: 2019-11-27

## 2019-11-27 DIAGNOSIS — M54.50 CHRONIC BILATERAL LOW BACK PAIN, UNSPECIFIED WHETHER SCIATICA PRESENT: ICD-10-CM

## 2019-11-27 DIAGNOSIS — R53.1 WEAKNESS: ICD-10-CM

## 2019-11-27 DIAGNOSIS — G89.29 CHRONIC BILATERAL LOW BACK PAIN, UNSPECIFIED WHETHER SCIATICA PRESENT: ICD-10-CM

## 2019-11-27 DIAGNOSIS — R29.898 LEFT LEG WEAKNESS: ICD-10-CM

## 2019-11-27 DIAGNOSIS — M48.062 SPINAL STENOSIS, LUMBAR REGION, WITH NEUROGENIC CLAUDICATION: Primary | ICD-10-CM

## 2019-11-27 PROCEDURE — 97110 THERAPEUTIC EXERCISES: CPT | Performed by: PHYSICAL THERAPIST

## 2019-11-27 NOTE — THERAPY TREATMENT NOTE
Outpatient Physical Therapy Ortho Treatment Note  [facility]     Patient Name: Vinicius Casas  : 1945  MRN: [id number]  Today's Date: 2019      Visit Date: 2019    Visit Dx:    ICD-10-CM ICD-9-CM   1. Spinal stenosis, lumbar region, with neurogenic claudication M48.062 724.03   2. Weakness R53.1 780.79   3. Left leg weakness R29.898 729.89   4. Chronic bilateral low back pain, unspecified whether sciatica present M54.5 724.2    G89.29 338.29       Patient Active Problem List   Diagnosis   • Spinal stenosis, lumbar region, with neurogenic claudication   • Spondylolisthesis of lumbar region   • Hx of melanoma excision   • Left leg weakness        Past Medical History:   Diagnosis Date   • High blood pressure    • Melanoma (CMS/HCC)         No past surgical history on file.                    PT Assessment/Plan     Row Name 19 1124          PT Assessment    Assessment Comments  Progressed to 5lb ankle weights with no immediate adverse reaction. Also added new resistive core stabilization exercises. Max verbal cueing for proper technique and core engagement with all exercises.   -CJ       User Key  (r) = Recorded By, (t) = Taken By, (c) = Cosigned By    Initials Name Provider Type    Isaiah Daniels, PT Physical Therapist            OP Exercises     Row Name 19 1000             Subjective Comments    Subjective Comments  Doing ok.   -CJ         Subjective Pain    Able to rate subjective pain?  yes  -CJ      Pre-Treatment Pain Level  0  -CJ      Post-Treatment Pain Level  0  -CJ         Total Minutes    12050 - PT Therapeutic Exercise Minutes  45  -CJ         Exercise 1    Exercise Name 1  Nustep, L5  -CJ      Time 1  6 min  -CJ         Exercise 2    Exercise Name 2  sidestepping, resisted  -CJ      Reps 2  3 laps  -CJ      Additional Comments  GTB  -CJ         Exercise 3    Exercise Name 3  calf stretch  -CJ      Reps 3  3  -CJ      Time 3  30 sec  -CJ         Exercise 4     Exercise Name 4  B Hamstring stretch  -CJ      Reps 4  2  -CJ      Time 4  30sec  -CJ         Exercise 5    Exercise Name 5  H/L hip abduction  -CJ      Sets 5  3  -CJ      Reps 5  10  -CJ      Additional Comments  BTB: B and Uni  -CJ         Exercise 6    Exercise Name 6  LTR with TA; 10-2  -CJ      Reps 6  15  -CJ      Additional Comments  BLE over therapy ball  -CJ         Exercise 7    Exercise Name 7  Bridges with adduction  -CJ      Reps 7  15  -CJ      Additional Comments  slow and controlled  -CJ         Exercise 8    Exercise Name 8  Leg Press  -CJ      Reps 8  20  -CJ      Additional Comments  120#  -CJ         Exercise 9    Exercise Name 9  DKTC with TA and green ball  -CJ      Reps 9  15  -CJ         Exercise 10    Exercise Name 10  B piriformis stretch  -CJ      Reps 10  2  -CJ      Time 10  30 SEC  -CJ         Exercise 11    Exercise Name 11  calf raises; 3 sec eccentric lowering  -CJ      Reps 11  20  -CJ         Exercise 12    Exercise Name 12  B LAQ, #5  -CJ      Reps 12  15  -CJ      Time 12  5 sec  -CJ         Exercise 13    Exercise Name 13  B HS curls, #5  -CJ      Reps 13  20  -CJ         Exercise 14    Exercise Name 14  B hip abduction  -CJ      Reps 14  15  -CJ      Additional Comments  #5  -CJ         Exercise 15    Exercise Name 15  *Anti-rotation with GTB  -CJ      Reps 15  20B  -CJ         Exercise 16    Exercise Name 16  Shldr Ext, GTB  -CJ      Reps 16  20  -CJ      Time 16  3 sec  -CJ        User Key  (r) = Recorded By, (t) = Taken By, (c) = Cosigned By    Initials Name Provider Type    CJ Isaiah Calderón, PT Physical Therapist                                          Time Calculation:   Start Time: 1000  Stop Time: 1045  Time Calculation (min): 45 min  Total Timed Code Minutes- PT: 45 minute(s)  Therapy Charges for Today     Code Description Service Date Service Provider Modifiers Qty    41529489061 HC PT THER PROC EA 15 MIN 11/27/2019 Isaiah Calderón, PT GP 3                    Isaiah MONTES DE OCA  Stephane, PT  11/27/2019

## 2019-12-02 ENCOUNTER — HOSPITAL ENCOUNTER (OUTPATIENT)
Dept: PHYSICAL THERAPY | Facility: HOSPITAL | Age: 74
Setting detail: THERAPIES SERIES
Discharge: HOME | End: 2019-12-02

## 2019-12-02 DIAGNOSIS — R29.898 LEFT LEG WEAKNESS: ICD-10-CM

## 2019-12-02 DIAGNOSIS — M48.062 SPINAL STENOSIS, LUMBAR REGION, WITH NEUROGENIC CLAUDICATION: Primary | ICD-10-CM

## 2019-12-02 DIAGNOSIS — G89.29 CHRONIC BILATERAL LOW BACK PAIN, UNSPECIFIED WHETHER SCIATICA PRESENT: ICD-10-CM

## 2019-12-02 DIAGNOSIS — M54.50 CHRONIC BILATERAL LOW BACK PAIN, UNSPECIFIED WHETHER SCIATICA PRESENT: ICD-10-CM

## 2019-12-02 DIAGNOSIS — R53.1 WEAKNESS: ICD-10-CM

## 2019-12-02 PROCEDURE — 97110 THERAPEUTIC EXERCISES: CPT | Performed by: PHYSICAL THERAPIST

## 2019-12-02 NOTE — THERAPY TREATMENT NOTE
"    Outpatient Physical Therapy Ortho Treatment Note  [facility]     Patient Name: Vinicius Casas  : 1945  MRN: [id number]  Today's Date: 2019      Visit Date: 2019    Visit Dx:    ICD-10-CM ICD-9-CM   1. Spinal stenosis, lumbar region, with neurogenic claudication M48.062 724.03   2. Weakness R53.1 780.79   3. Left leg weakness R29.898 729.89   4. Chronic bilateral low back pain, unspecified whether sciatica present M54.5 724.2    G89.29 338.29       Patient Active Problem List   Diagnosis   • Spinal stenosis, lumbar region, with neurogenic claudication   • Spondylolisthesis of lumbar region   • Hx of melanoma excision   • Left leg weakness        Past Medical History:   Diagnosis Date   • High blood pressure    • Melanoma (CMS/HCC)         No past surgical history on file.                    PT Assessment/Plan     Row Name 19 1125          PT Assessment    Assessment Comments  Patient reporting minimal to no change in \"nerve\" symptoms in leg since initiating sessions despite feeling a littler stronger. Reports plans to return to neurosurgeon in  to discuss further treatments. Discussion on potential for asking about neurotin/gabapentin nerve medication to address LE discomfort. Also discussed potential for management vs. surgical intervention. Plan for one more therapy session to finalize HEP and discharge to self management of strengthening program. Patient agreeable as he will be traveling a fair amount the next 4-6 weeks.  -WM       User Key  (r) = Recorded By, (t) = Taken By, (c) = Cosigned By    Initials Name Provider Type    Isaiah Daniels, PT Physical Therapist            OP Exercises     Row Name 19 1000             Subjective Comments    Subjective Comments  My legs were sore over the weekend. I dont know what that was about.  -WM         Subjective Pain    Able to rate subjective pain?  yes  -WM      Pre-Treatment Pain Level  0  -WM      Post-Treatment Pain Level "  0  -CJ         Total Minutes    71875 - PT Therapeutic Exercise Minutes  45  -CJ         Exercise 1    Exercise Name 1  Nustep, L5  -CJ      Time 1  6 min  -CJ         Exercise 3    Exercise Name 3  calf stretch  -CJ      Reps 3  3  -CJ      Time 3  30 sec  -CJ         Exercise 4    Exercise Name 4  B Hamstring stretch  -CJ      Reps 4  2  -CJ      Time 4  30sec  -CJ         Exercise 5    Exercise Name 5  H/L hip abduction  -CJ      Sets 5  3  -CJ      Reps 5  10  -CJ      Additional Comments  BTB: B and Uni  -CJ         Exercise 6    Exercise Name 6  LTR with TA; 10-2  -CJ      Reps 6  15  -CJ         Exercise 7    Exercise Name 7  Bridges with adduction  -CJ      Reps 7  15  -CJ         Exercise 8    Exercise Name 8  Leg Press  -CJ      Reps 8  20  -CJ      Additional Comments  #120  -CJ         Exercise 9    Exercise Name 9  DKTC with TA and green ball  -CJ      Reps 9  15  -CJ         Exercise 10    Exercise Name 10  B piriformis stretch  -CJ      Reps 10  2  -CJ      Time 10  30 SEC  -CJ         Exercise 11    Exercise Name 11  calf raises; 3 sec eccentric lowering  -CJ      Reps 11  20  -CJ         Exercise 12    Exercise Name 12  B LAQ, #5  -CJ      Reps 12  15  -CJ      Time 12  5 sec  -CJ         Exercise 13    Exercise Name 13  B HS curls, #5  -CJ      Reps 13  20  -CJ         Exercise 14    Exercise Name 14  B hip abduction  -CJ      Reps 14  15 B  -CJ      Additional Comments  #5  -CJ         Exercise 15    Exercise Name 15  *Anti-rotation with GTB  -CJ      Reps 15  20B  -CJ         Exercise 16    Exercise Name 16  Shldr Ext, GTB  -CJ      Reps 16  20  -CJ      Time 16  3 sec  -CJ        User Key  (r) = Recorded By, (t) = Taken By, (c) = Cosigned By    Initials Name Provider Type     Isaiah Calderón S, PT Physical Therapist                                          Time Calculation:   Start Time: 1000  Stop Time: 1045  Time Calculation (min): 45 min  Total Timed Code Minutes- PT: 45 minute(s)  Therapy  Charges for Today     Code Description Service Date Service Provider Modifiers Qty    50593486212 HC PT THER PROC EA 15 MIN 12/2/2019 Isaiah Calderón, PT GP 3                    Isaiah Calderón, PT  12/2/2019

## 2019-12-06 ENCOUNTER — HOSPITAL ENCOUNTER (OUTPATIENT)
Dept: PHYSICAL THERAPY | Facility: HOSPITAL | Age: 74
Setting detail: THERAPIES SERIES
Discharge: HOME | End: 2019-12-06

## 2019-12-06 DIAGNOSIS — M54.50 CHRONIC BILATERAL LOW BACK PAIN, UNSPECIFIED WHETHER SCIATICA PRESENT: ICD-10-CM

## 2019-12-06 DIAGNOSIS — R53.1 WEAKNESS: ICD-10-CM

## 2019-12-06 DIAGNOSIS — G89.29 CHRONIC BILATERAL LOW BACK PAIN, UNSPECIFIED WHETHER SCIATICA PRESENT: ICD-10-CM

## 2019-12-06 DIAGNOSIS — R29.898 LEFT LEG WEAKNESS: ICD-10-CM

## 2019-12-06 DIAGNOSIS — M48.062 SPINAL STENOSIS, LUMBAR REGION, WITH NEUROGENIC CLAUDICATION: Primary | ICD-10-CM

## 2019-12-06 PROCEDURE — 97110 THERAPEUTIC EXERCISES: CPT

## 2019-12-06 NOTE — THERAPY DISCHARGE NOTE
Outpatient Physical Therapy Ortho Treatment Note/Discharge Summary  [facility]     Patient Name: Vinicius Casas  : 1945  MRN: [id number]  Today's Date: 2019      Visit Date: 2019    Visit Dx:    ICD-10-CM ICD-9-CM   1. Spinal stenosis, lumbar region, with neurogenic claudication M48.062 724.03   2. Weakness R53.1 780.79   3. Left leg weakness R29.898 729.89   4. Chronic bilateral low back pain, unspecified whether sciatica present M54.5 724.2    G89.29 338.29       Patient Active Problem List   Diagnosis   • Spinal stenosis, lumbar region, with neurogenic claudication   • Spondylolisthesis of lumbar region   • Hx of melanoma excision   • Left leg weakness        Past Medical History:   Diagnosis Date   • High blood pressure    • Melanoma (CMS/HCC)         No past surgical history on file.                    PT Assessment/Plan     Row Name 19 1024          PT Assessment    Assessment Comments  Pt educated on final HEP and demonstrates good form with exercise. LAQ, hip abduction, and HS curl performed w/ TB to allow home performance. Pt educated on need for cont HEP performance to maintain and improve LE strength prior to planned surgery to make recovery easier.   -        PT Plan    PT Plan Comments  DC to HEP  -       User Key  (r) = Recorded By, (t) = Taken By, (c) = Cosigned By    Initials Name Provider Type    Domonique Walton, PT Physical Therapist              OP Exercises     Row Name 19 1000             Subjective Comments    Subjective Comments  Pt reports he had a really rough day yesterday. Lots of leg pain. Today is a little better but still hurting. Reports he got second opinion on need for lumbar surgery and was told that he definintely needs it. Is planning to discuss this with MD in January at follow up. Reports he will cont w/ HEP until then to maintain strength.   -         Total Minutes    90877 - PT Therapeutic Exercise Minutes  45  -          Exercise 1    Exercise Name 1  Nustep, L5  -JH      Time 1  6 min  -JH         Exercise 3    Exercise Name 3  calf stretch  -JH      Reps 3  3  -JH      Time 3  30 sec  -JH         Exercise 4    Exercise Name 4  B Hamstring stretch  -JH      Reps 4  2  -JH      Time 4  30sec  -JH         Exercise 5    Exercise Name 5  H/L hip abduction  -JH      Sets 5  3  -JH      Reps 5  10  -JH      Additional Comments  BTB B and uni  -JH         Exercise 6    Exercise Name 6  LTR with TA; 10-2  -JH      Reps 6  15  -JH         Exercise 7    Exercise Name 7  Bridges with adduction  -JH      Reps 7  15  -JH         Exercise 8    Exercise Name 8  --  -JH      Reps 8  --  -JH         Exercise 9    Exercise Name 9  DKTC with TA and green ball  -JH      Reps 9  15  -JH         Exercise 10    Exercise Name 10  B piriformis stretch  -JH      Reps 10  2  -JH      Time 10  30 SEC  -JH         Exercise 11    Exercise Name 11  calf raises; 3 sec eccentric lowering  -JH      Reps 11  20  -JH         Exercise 12    Exercise Name 12  B LAQ,   -JH      Reps 12  15  -JH      Time 12  5 sec  -JH      Additional Comments  BTB  -JH         Exercise 13    Exercise Name 13  B HS curls  -JH      Reps 13  20  -JH      Additional Comments  BTB  -JH         Exercise 14    Exercise Name 14  B hip abduction  -JH      Reps 14  15 B  -JH      Additional Comments  BTB  -JH         Exercise 15    Exercise Name 15  *Anti-rotation with GTB  -JH      Reps 15  20B  -JH         Exercise 16    Exercise Name 16  Shldr Ext, GTB  -JH      Reps 16  20  -JH      Time 16  3 sec  -JH        User Key  (r) = Recorded By, (t) = Taken By, (c) = Cosigned By    Initials Name Provider Type    Domonique Walton, PT Physical Therapist                         PT OP Goals     Row Name 12/06/19 1000          PT Short Term Goals    STG Date to Achieve  12/07/19  -JH     STG 1  Patient will be independent with education for symptom management and initial HEP  -JH     STG 1 Progress   Met  -     STG 2  Pt will demonstrate improved resting standing posture, no longer resting in anteriorly shifted pelvis to reduce stress on lumbar spine  -     STG 2 Progress  Partially Met  -     STG 3  Pt will cont to report pain levels <2/10 throughout treatment  -     STG 3 Progress  Met  -        Long Term Goals    LTG Date to Achieve  01/06/20  -     LTG 1  Pt will be independent w/ advanced HEP to maintain improved postures and reduce liklihood of re-irritation of tissue.  -     LTG 1 Progress  Met  -     LTG 2  Pt will improve B LE strength to at least 5-/5.  -     LTG 2 Progress  Partially Met  -     LTG 2 Progress Comments  4+/5 B  -     LTG 3  Pt will demonstrate improve SLS balance Bilaterally to 15 seconds  -     LTG 3 Progress  Met  -     LTG 4  Pt will demo improvement in modified oswestry score from 46% disability to 35% to demonstrate improvement in functional capacity  -     LTG 4 Progress  Not Met  -     LTG 4 Progress Comments  46%  -       User Key  (r) = Recorded By, (t) = Taken By, (c) = Cosigned By    Initials Name Provider Type    Domonique Walton, PT Physical Therapist          Therapy Education  Education Details: DC to HEP  Given: HEP, Symptoms/condition management, Pain management, Posture/body mechanics  Program: Reinforced  How Provided: Verbal  Provided to: Patient  Level of Understanding: Teach back education performed, Verbalized, Demonstrated       Modified Oswestry  Modified Oswestry Score/Comments: 46%      Time Calculation:   Start Time: 1000  Stop Time: 1045  Time Calculation (min): 45 min  Therapy Charges for Today     Code Description Service Date Service Provider Modifiers Qty    49868069603 HC PT THER PROC EA 15 MIN 12/6/2019 Domonique Pruitt, PT GP 3          PT G-Codes  Modified Oswestry Score/Comments: 46%     OP PT Discharge Summary  Date of Discharge: 12/06/19  Reason for Discharge: Maximum functional potential achieved, other  (comment)(plateau in progress)  Outcomes Achieved: Patient able to partially acheive established goals  Discharge Destination: Home with home program  Discharge Instructions/Additional Comments: cont w/ HEP even if planning for surgery to maintain and improve strength and allow improved recovery      Domonique Pruitt, PT  12/6/2019

## 2020-01-24 NOTE — PROGRESS NOTES
Subjective   Patient ID: Vinicius Casas is a 75 y.o. male is here today for follow-up after PT which did not help.  He is following HEP.  He states that he has seen a chiropractor which has helped and so has [facility]      Patient was last seen 10.23.19 for left leg weakness    Patient states that he is not currently having any low back or leg pain, weakness, N/T, urinary incontinence or problems with his balance and gait.    This gentleman has known spinal stenosis at L4-L5 with a spondylolisthesis.  He has never really had any significant pain recently.  He takes CBD oil and he attributes that to his lack of pain.  He had complained of what probably could be described as a sense of weakness.  He went to therapy and the stretching exercises seem to help.  He feels that he is asymptomatic now.  We can keep it open ended.  He is aware that symptoms of pain and perhaps outright weakness objectively can recur and if that happens, he can certainly see me again.      History of Present Illness    The following portions of the patient's history were reviewed and updated as appropriate: allergies, current medications, past family history, past medical history, past social history, past surgical history and problem list.    Review of Systems   Musculoskeletal: Negative for arthralgias, back pain, gait problem and myalgias.   Neurological: Negative for weakness and numbness.   All other systems reviewed and are negative.      Objective   Physical Exam   Constitutional: He is oriented to person, place, and time. He appears well-developed and well-nourished.   HENT:   Head: Normocephalic and atraumatic.   Eyes: Pupils are equal, round, and reactive to light. Conjunctivae and EOM are normal.   Fundoscopic exam:       The right eye shows no papilledema. The right eye shows venous pulsations.        The left eye shows no papilledema. The left eye shows venous pulsations.   Neck: Carotid bruit is not present.   Neurological: He is  oriented to person, place, and time. He has a normal Finger-Nose-Finger Test and a normal Heel to Shin Test. Gait normal.   Reflex Scores:       Tricep reflexes are 2+ on the right side and 2+ on the left side.       Bicep reflexes are 2+ on the right side and 2+ on the left side.       Brachioradialis reflexes are 2+ on the right side and 2+ on the left side.       Patellar reflexes are 2+ on the right side and 2+ on the left side.       Achilles reflexes are 2+ on the right side and 2+ on the left side.  Psychiatric: His speech is normal.     Neurologic Exam     Mental Status   Oriented to person, place, and time.   Registration of memory: Good recent and remote memory.   Attention: normal. Concentration: normal.   Speech: speech is normal   Level of consciousness: alert  Knowledge: consistent with education.     Cranial Nerves     CN II   Visual fields full to confrontation.   Visual acuity: normal    CN III, IV, VI   Pupils are equal, round, and reactive to light.  Extraocular motions are normal.     CN V   Facial sensation intact.   Right corneal reflex: normal  Left corneal reflex: normal    CN VII   Facial expression full, symmetric.   Right facial weakness: none  Left facial weakness: none    CN VIII   Hearing: intact    CN IX, X   Palate: symmetric    CN XI   Right sternocleidomastoid strength: normal  Left sternocleidomastoid strength: normal    CN XII   Tongue: not atrophic  Tongue deviation: none    Motor Exam   Muscle bulk: normal  Right arm tone: normal  Left arm tone: normal  Right leg tone: normal  Left leg tone: normal    Strength   Strength 5/5 except as noted.     Sensory Exam   Light touch normal.     Gait, Coordination, and Reflexes     Gait  Gait: normal    Coordination   Finger to nose coordination: normal  Heel to shin coordination: normal    Reflexes   Right brachioradialis: 2+  Left brachioradialis: 2+  Right biceps: 2+  Left biceps: 2+  Right triceps: 2+  Left triceps: 2+  Right patellar:  2+  Left patellar: 2+  Right achilles: 2+  Left achilles: 2+  Right : 2+  Left : 2+      Assessment/Plan   Independent Review of Radiographic Studies:    I reviewed the new MRI done recently which did show severe spinal stenosis at L4-L5 with a spondylolisthesis and mild degree of stenosis at L3-L4.  Agree with the report.    Medical Decision Making:    He is doing well at this point and we can keep it open-ended.  If symptoms involving pain or weakness recur in the future, he can certainly come back to see me.      Vinicius was seen today for follow-up.    Diagnoses and all orders for this visit:    Spinal stenosis, lumbar region, with neurogenic claudication    Spondylolisthesis of lumbar region      Return if symptoms worsen or fail to improve.

## 2020-01-29 ENCOUNTER — OFFICE VISIT (OUTPATIENT)
Dept: NEUROSURGERY | Facility: CLINIC | Age: 75
End: 2020-01-29

## 2020-01-29 VITALS
WEIGHT: 220 LBS | DIASTOLIC BLOOD PRESSURE: 72 MMHG | HEART RATE: 70 BPM | HEIGHT: 71 IN | SYSTOLIC BLOOD PRESSURE: 171 MMHG | BODY MASS INDEX: 30.8 KG/M2

## 2020-01-29 DIAGNOSIS — M43.16 SPONDYLOLISTHESIS OF LUMBAR REGION: ICD-10-CM

## 2020-01-29 DIAGNOSIS — M48.062 SPINAL STENOSIS, LUMBAR REGION, WITH NEUROGENIC CLAUDICATION: Primary | ICD-10-CM

## 2020-01-29 PROCEDURE — 99213 OFFICE O/P EST LOW 20 MIN: CPT | Performed by: NEUROLOGICAL SURGERY

## 2021-03-25 ENCOUNTER — TELEPHONE (OUTPATIENT)
Dept: NEUROSURGERY | Facility: CLINIC | Age: 76
End: 2021-03-25

## 2021-03-25 NOTE — TELEPHONE ENCOUNTER
Caller: VIRI STACY    Relationship to patient: SELF    Best call back number: [phone]    Chief complaint: SPINAL STENOSIS, LUMBAR REGION    Type of visit:  FOLLOW UP EXT     Requested date: AFTER 04-01-21     If rescheduling, when is the original appointment: N/A     Additional notes: PATIENT IS CALLING TO SCHEDULE AN APPT, PATIENT STATED HIS BACK PAIN IS WORSENING. PATIENT LAST SAW DR. ESCALANTE 01-29-20. PLEASE ADVISE. HE WOULD LIKE TO BE SEEN SOON AND IS OKAY TO SEE A PA-C, HE HAS NO RECENT IMG.

## 2021-03-25 NOTE — TELEPHONE ENCOUNTER
Called and spoke with patient in regards to setting up an appointment. Patient voiced that 4/16/21 with LUZ Escobar appointment was ok.

## 2021-04-16 ENCOUNTER — OFFICE VISIT (OUTPATIENT)
Dept: NEUROSURGERY | Facility: CLINIC | Age: 76
End: 2021-04-16

## 2021-04-16 VITALS
SYSTOLIC BLOOD PRESSURE: 160 MMHG | DIASTOLIC BLOOD PRESSURE: 82 MMHG | HEIGHT: 71 IN | TEMPERATURE: 98.6 F | BODY MASS INDEX: 30.52 KG/M2 | WEIGHT: 218 LBS | HEART RATE: 86 BPM

## 2021-04-16 DIAGNOSIS — M48.062 SPINAL STENOSIS, LUMBAR REGION, WITH NEUROGENIC CLAUDICATION: Primary | ICD-10-CM

## 2021-04-16 DIAGNOSIS — M43.16 SPONDYLOLISTHESIS OF LUMBAR REGION: ICD-10-CM

## 2021-04-16 DIAGNOSIS — R29.898 LEFT LEG WEAKNESS: ICD-10-CM

## 2021-04-16 PROCEDURE — 99214 OFFICE O/P EST MOD 30 MIN: CPT | Performed by: NURSE PRACTITIONER

## 2021-04-16 RX ORDER — IBUPROFEN 400 MG/1
400 TABLET ORAL DAILY PRN
COMMUNITY

## 2021-04-19 ENCOUNTER — TELEPHONE (OUTPATIENT)
Dept: NEUROSURGERY | Facility: CLINIC | Age: 76
End: 2021-04-19

## 2021-04-19 NOTE — TELEPHONE ENCOUNTER
PATIENT CALLED TO STATE THAT CRYSTAL BECK ORDERED THE WRONG IMAGING.  THE MRI IS FINE BUT INSTEAD OF THE EMG, HE NEEDED THE XRAYS.  DR FARRAR ALWAYS ORDERS MRI AND XRAYS.  HE WOULD LIKE SOMEONE TO LOOK AT HIS CHART AND MAKE THE CORRECTIONS SO HE WILL HAVE IT DONE BY HIS NEXT VISIT WITH DR FARRAR.  PLEASE CALL PATIENT AND ADVISE    [phone]

## 2021-04-26 DIAGNOSIS — M43.16 SPONDYLOLISTHESIS OF LUMBAR REGION: Primary | ICD-10-CM

## 2021-05-12 ENCOUNTER — HOSPITAL ENCOUNTER (OUTPATIENT)
Dept: GENERAL RADIOLOGY | Facility: HOSPITAL | Age: 76
Discharge: HOME | End: 2021-05-12
Admitting: NEUROLOGICAL SURGERY

## 2021-05-12 DIAGNOSIS — M43.16 SPONDYLOLISTHESIS OF LUMBAR REGION: ICD-10-CM

## 2021-05-12 PROCEDURE — 72114 X-RAY EXAM L-S SPINE BENDING: CPT

## 2021-05-17 ENCOUNTER — HOSPITAL ENCOUNTER (OUTPATIENT)
Dept: MRI IMAGING | Facility: HOSPITAL | Age: 76
Discharge: HOME | End: 2021-05-17
Admitting: NURSE PRACTITIONER

## 2021-05-17 DIAGNOSIS — M43.16 SPONDYLOLISTHESIS OF LUMBAR REGION: ICD-10-CM

## 2021-05-17 DIAGNOSIS — M48.062 SPINAL STENOSIS, LUMBAR REGION, WITH NEUROGENIC CLAUDICATION: ICD-10-CM

## 2021-05-17 DIAGNOSIS — R29.898 LEFT LEG WEAKNESS: ICD-10-CM

## 2021-05-17 PROCEDURE — A9577 INJ MULTIHANCE: HCPCS | Performed by: NURSE PRACTITIONER

## 2021-05-17 PROCEDURE — 0 GADOBENATE DIMEGLUMINE 529 MG/ML SOLUTION: Performed by: NURSE PRACTITIONER

## 2021-05-17 PROCEDURE — 82565 ASSAY OF CREATININE: CPT

## 2021-05-17 PROCEDURE — 72158 MRI LUMBAR SPINE W/O & W/DYE: CPT

## 2021-05-17 RX ADMIN — GADOBENATE DIMEGLUMINE 20 ML: 529 INJECTION, SOLUTION INTRAVENOUS at 21:15

## 2021-05-18 LAB — CREAT BLDA-MCNC: 1.2 MG/DL (ref 0.6–1.3)

## 2021-06-03 ENCOUNTER — TELEPHONE (OUTPATIENT)
Dept: NEUROSURGERY | Facility: CLINIC | Age: 76
End: 2021-06-03

## 2021-06-03 NOTE — TELEPHONE ENCOUNTER
Caller: Vinicius Casas    Relationship to patient: Self    Best call back number: [phone]    Chief complaint:     Type of visit: FOLLOW UP    Requested date:     If rescheduling, when is the original appointment: 06/04/21    Additional notes:PT HAS DECIDED TO GO TO A DIFFERENT DR. APPOINTMENT HAS BEEN CANCELLED.

## 2021-06-10 NOTE — TELEPHONE ENCOUNTER
I see. I read the message as he had a different doctor appointment on the same day as Dr. Garrison, thus cancelling that appointment. I will attach Samantha onto this message so maybe she can reach out to him regarding his decision to change providers.

## 2021-06-14 ENCOUNTER — APPOINTMENT (OUTPATIENT)
Dept: INFUSION THERAPY | Facility: HOSPITAL | Age: 76
End: 2021-06-14

## 2021-07-19 ENCOUNTER — TRANSCRIBE ORDERS (OUTPATIENT)
Dept: ADMINISTRATIVE | Facility: HOSPITAL | Age: 76
End: 2021-07-19

## 2021-07-19 ENCOUNTER — LAB (OUTPATIENT)
Dept: LAB | Facility: HOSPITAL | Age: 76
End: 2021-07-19

## 2021-07-19 ENCOUNTER — HOSPITAL ENCOUNTER (OUTPATIENT)
Dept: CARDIOLOGY | Facility: HOSPITAL | Age: 76
Discharge: HOME | End: 2021-07-19

## 2021-07-19 ENCOUNTER — HOSPITAL ENCOUNTER (OUTPATIENT)
Dept: GENERAL RADIOLOGY | Facility: HOSPITAL | Age: 76
Discharge: HOME | End: 2021-07-19

## 2021-07-19 DIAGNOSIS — Z01.811 PRE-OP CHEST EXAM: ICD-10-CM

## 2021-07-19 DIAGNOSIS — Z01.818 PRE-OP EXAM: Primary | ICD-10-CM

## 2021-07-19 DIAGNOSIS — Z01.818 PRE-OP EXAM: ICD-10-CM

## 2021-07-19 LAB
ALBUMIN SERPL-MCNC: 4 G/DL (ref 3.5–5.2)
ALBUMIN/GLOB SERPL: 1.6 G/DL
ALP SERPL-CCNC: 47 U/L (ref 39–117)
ALT SERPL W P-5'-P-CCNC: 16 U/L (ref 1–41)
ANION GAP SERPL CALCULATED.3IONS-SCNC: 12 MMOL/L (ref 5–15)
AST SERPL-CCNC: 14 U/L (ref 1–40)
BILIRUB SERPL-MCNC: 0.3 MG/DL (ref 0–1.2)
BUN SERPL-MCNC: 20 MG/DL (ref 8–23)
BUN/CREAT SERPL: 18.3 (ref 7–25)
CALCIUM SPEC-SCNC: 9.2 MG/DL (ref 8.6–10.5)
CHLORIDE SERPL-SCNC: 103 MMOL/L (ref 98–107)
CO2 SERPL-SCNC: 24 MMOL/L (ref 22–29)
CREAT SERPL-MCNC: 1.09 MG/DL (ref 0.76–1.27)
DEPRECATED RDW RBC AUTO: 46.8 FL (ref 37–54)
ERYTHROCYTE [DISTWIDTH] IN BLOOD BY AUTOMATED COUNT: 13.7 % (ref 12.3–15.4)
GFR SERPL CREATININE-BSD FRML MDRD: 66 ML/MIN/1.73
GLOBULIN UR ELPH-MCNC: 2.5 GM/DL
GLUCOSE SERPL-MCNC: 113 MG/DL (ref 65–99)
HCT VFR BLD AUTO: 40.5 % (ref 37.5–51)
HGB BLD-MCNC: 13.6 G/DL (ref 13–17.7)
MCH RBC QN AUTO: 30.8 PG (ref 26.6–33)
MCHC RBC AUTO-ENTMCNC: 33.6 G/DL (ref 31.5–35.7)
MCV RBC AUTO: 91.8 FL (ref 79–97)
PLATELET # BLD AUTO: 182 10*3/MM3 (ref 140–450)
PMV BLD AUTO: 10 FL (ref 6–12)
POTASSIUM SERPL-SCNC: 4.1 MMOL/L (ref 3.5–5.2)
PROT SERPL-MCNC: 6.5 G/DL (ref 6–8.5)
QT INTERVAL: 447 MS
RBC # BLD AUTO: 4.41 10*6/MM3 (ref 4.14–5.8)
SODIUM SERPL-SCNC: 139 MMOL/L (ref 136–145)
WBC # BLD AUTO: 6.69 10*3/MM3 (ref 3.4–10.8)

## 2021-07-19 PROCEDURE — 85027 COMPLETE CBC AUTOMATED: CPT

## 2021-07-19 PROCEDURE — 93005 ELECTROCARDIOGRAM TRACING: CPT | Performed by: ORTHOPAEDIC SURGERY

## 2021-07-19 PROCEDURE — 71046 X-RAY EXAM CHEST 2 VIEWS: CPT

## 2021-07-19 PROCEDURE — 80053 COMPREHEN METABOLIC PANEL: CPT

## 2021-07-19 PROCEDURE — 36415 COLL VENOUS BLD VENIPUNCTURE: CPT

## 2021-07-19 PROCEDURE — 93010 ELECTROCARDIOGRAM REPORT: CPT | Performed by: INTERNAL MEDICINE

## 2021-08-03 ENCOUNTER — TELEPHONE (OUTPATIENT)
Dept: NEUROSURGERY | Facility: CLINIC | Age: 76
End: 2021-08-03

## 2021-08-03 NOTE — TELEPHONE ENCOUNTER
Provider:BORIS  Caller:PT  Relationship to Patient:SELF  Pharmacy:[facility]  Phone Number:[phone]  Reason for Call:PT CALLED AND STATED THAT HIS LEFT LEG IS NOT WANTING TO FUNCTION-PT STATES THAT HE IS PRETTY MUCH CONFINED TO A CHAIR/LOW BACK PAIN AS WELL/PT ALSO REPORTED THAT HIS LEFT LEG HAS BEEN SWELLING   When was the patient last seen:04/16/21 WITH CRYSTAL BECK  When did it start:2 TO 3 WEEKS  Where is it located:LEFT  Characteristics of symptom/severity:CONSTANT PAIN OF AROUND 2 OR 3 AND SEVERE PAIN THAT COMES AND GOES AROUND AN 8   Timing- Is it constant or intermittent:CONSTANT  What makes it worse:WALKING MAKES IT WORSE  What makes it better:SITTING MAKES IT BETTER  What therapies/medications have you tried: PT HAS TAKING CBD OIL AND IBUPROFEN FOR PAIN. PT STATES THAT HE HAD CANCELLED BACK IN June BECAUSE HE WAS GOING TO SEE ANOTHER DOCTOR. PT STATES THAT HE MADE A MISTAKE AND DID NOT FOLLOW UP.

## 2021-08-05 NOTE — TELEPHONE ENCOUNTER
I called and LVM for patient to call the office back to discuss who he saw and when he saw them. We will need to review the records before we can get him back on the schedule. Please advise.

## 2021-11-15 ENCOUNTER — TELEPHONE (OUTPATIENT)
Dept: NEUROSURGERY | Facility: CLINIC | Age: 76
End: 2021-11-15

## 2021-11-15 NOTE — TELEPHONE ENCOUNTER
Patient would like to see [facility] for back pain in Dec.  Saw Sadia 04-21.  Patient is having trouble now.    Patient had a Knee replacement in 8-21

## 2021-11-15 NOTE — TELEPHONE ENCOUNTER
Caller: Vinicius Casas    Relationship to patient: Self    Best call back number: [phone]    Chief complaint:STILL HAVING ISSUES WITH PINCHED NERVE    Type of visit:FOLLOW UP    Requested date: ASAP    If rescheduling, when is the original appointment: NA    Additional notes:PT CALLED AND STATES THAT HE WANTS TO MAKE AN APPT. TO SEE -PT STATES THAT HE IS STILL HAVING ISSUES WITH A PINCHED NERVE IN HIS BACK-PT STATES THAT HE NEVER HEARD ANYTHING BACK FROM OUR OFFICE BEFORE-I ADVISED OF THE PREV. ENCOUNTER AND PT STATES THAT HE HAD A HIP REPLEACEMENT-PLEASE ADVISE THANK YOU

## 2022-01-10 NOTE — PROGRESS NOTES
"Subjective   Patient ID: Vinicius Casas is a 76 y.o. male is here today for follow-up for back pain. He was last seen on 4/16/21 for left leg pain.       History of Present Illness     Mr. Casas and his medical problems are new to me today. Mr. Casas reports he had gotten a second opinion last June from an orthopedic spine surgeon who conducted an EMG/NCS and ultimately recommended he see his partner for evaluation of the left hip. He underwent L hip replacement with Dr. Bailey. He noticed improvement in a lot of issues he was having in the left hip and upper leg. He still has some intermittent pain and tingling in the left lateral lower leg just above the left lateral ankle. He also c/o R buttock pain to his orthopedic surgeon who told him it was SI joint inflammation. He denies any bowel/bladder incontinence. He reports about 4 weeks ago he started taking an amino acid supplement. He has since noticed resolution in the symptoms he was having in both legs.     The following portions of the patient's history were reviewed and updated as appropriate: allergies, current medications, past family history, past medical history, past social history, past surgical history and problem list.    Review of Systems   Constitutional: Negative for activity change.   Musculoskeletal: Positive for back pain.   Neurological: Negative for numbness.       Objective     Vitals:    01/14/22 1025   BP: 140/84   Pulse: 77   Temp: 98.1 °F (36.7 °C)   SpO2: 99%   Weight: 98.9 kg (218 lb 0.6 oz)   Height: 180.3 cm (71\")     Body mass index is 30.41 kg/m².  Patient's Body mass index is 30.41 kg/m². indicating that he is obese (BMI >30). Obesity-related health conditions include the following: coronary heart disease, diabetes mellitus and peripheral vascular disease. Obesity is unchanged. BMI is is above average; no BMI management plan is appropriate.     Physical Exam  Vitals reviewed.   Constitutional:       General: He is not in " acute distress.     Appearance: Normal appearance. He is well-developed. He is not ill-appearing or diaphoretic.   HENT:      Head: Normocephalic and atraumatic.   Eyes:      General:         Right eye: No discharge.         Left eye: No discharge.      Conjunctiva/sclera: Conjunctivae normal.   Neck:      Trachea: No tracheal deviation.   Cardiovascular:      Rate and Rhythm: Normal rate.   Pulmonary:      Effort: Pulmonary effort is normal. No respiratory distress.   Abdominal:      General: There is no distension.      Palpations: Abdomen is soft.      Tenderness: There is no abdominal tenderness.   Musculoskeletal:         General: No tenderness. Normal range of motion.      Cervical back: Normal range of motion and neck supple.   Skin:     General: Skin is warm and dry.      Findings: No erythema.   Neurological:      Mental Status: He is alert and oriented to person, place, and time.      GCS: GCS eye subscore is 4. GCS verbal subscore is 5. GCS motor subscore is 6.      Sensory: No sensory deficit.      Motor: No weakness or abnormal muscle tone.      Coordination: Coordination normal.      Deep Tendon Reflexes: Reflexes are normal and symmetric. Reflexes normal.      Comments: No motor or sensory deficits. DTR's normal. Negative Olmos's; negative clonus. SLR negative bilaterally.  Able to bear weight on heels and toes bilaterally.     Psychiatric:         Behavior: Behavior is cooperative.         Thought Content: Thought content normal.       Neurologic Exam     Mental Status   Oriented to person, place, and time.       Assessment/Plan   Independent Review of Radiographic Studies:      I personally reviewed the EMG/NCS report dated6/25/21.  This study revealed a chronic L5-S1 radiculopathy in the left lower leg as well as sensory loss peripheral neuropathy in both lower extremities.    Medical Decision Making:      Mr. Casas is doing quite well.  He has undergone left hip replacement surgery since his  last visit here.  Right now he is doing well.  I reviewed the previous lumbar MRI images with the patient.  He understands that the lumbar MRI shows rather significant stenosis at L4-5 as well as anterolisthesis.  He also understands that surgery would be quite an undertaking and would more than likely require fusion.  If he is free of pain and not weak, there is really no need to pursue surgery at this point.  He affirms that he feels his symptoms are manageable at this time.    Mr. Casas was encouraged to call the office if his symptoms worsen otherwise we will see him again in 6 months.    Diagnoses and all orders for this visit:    1. Spinal stenosis, lumbar region, with neurogenic claudication (Primary)    2. Spondylolisthesis of lumbar region      Return in about 6 months (around 7/14/2022) for Susan Sainz.

## 2022-01-14 ENCOUNTER — OFFICE VISIT (OUTPATIENT)
Dept: NEUROSURGERY | Facility: CLINIC | Age: 77
End: 2022-01-14

## 2022-01-14 ENCOUNTER — TELEPHONE (OUTPATIENT)
Dept: NEUROSURGERY | Facility: CLINIC | Age: 77
End: 2022-01-14

## 2022-01-14 VITALS
DIASTOLIC BLOOD PRESSURE: 84 MMHG | OXYGEN SATURATION: 99 % | WEIGHT: 218.03 LBS | TEMPERATURE: 98.1 F | HEIGHT: 71 IN | SYSTOLIC BLOOD PRESSURE: 140 MMHG | HEART RATE: 77 BPM | BODY MASS INDEX: 30.52 KG/M2

## 2022-01-14 DIAGNOSIS — M48.062 SPINAL STENOSIS, LUMBAR REGION, WITH NEUROGENIC CLAUDICATION: Primary | ICD-10-CM

## 2022-01-14 DIAGNOSIS — M43.16 SPONDYLOLISTHESIS OF LUMBAR REGION: ICD-10-CM

## 2022-01-14 PROCEDURE — 99213 OFFICE O/P EST LOW 20 MIN: CPT | Performed by: NURSE PRACTITIONER

## 2022-01-14 RX ORDER — LANOLIN ALCOHOL/MO/W.PET/CERES
CREAM (GRAM) TOPICAL
COMMUNITY

## 2022-01-14 RX ORDER — PERPHENAZINE/AMITRIPTYLINE HCL 4 MG-25 MG
TABLET ORAL
COMMUNITY

## 2022-03-04 ENCOUNTER — TELEPHONE (OUTPATIENT)
Dept: NEUROSURGERY | Facility: CLINIC | Age: 77
End: 2022-03-04

## 2022-03-04 DIAGNOSIS — M43.16 SPONDYLOLISTHESIS OF LUMBAR REGION: ICD-10-CM

## 2022-03-04 DIAGNOSIS — M48.062 SPINAL STENOSIS, LUMBAR REGION, WITH NEUROGENIC CLAUDICATION: Primary | ICD-10-CM

## 2022-03-04 DIAGNOSIS — R29.898 LEFT LEG WEAKNESS: ICD-10-CM

## 2022-03-04 NOTE — TELEPHONE ENCOUNTER
Caller: VIRI     Relationship: SELF     Best call back number:      What is the best time to reach you:    Who are you requesting to speak with (clinical staff, provider,  specific staff member):     Do you know the name of the person who called:     What was the call regarding:     PT LAST SEEN ANTHONY FITZGERALD ON  1/14/2022.  NOTES STATES TO RETURN 7/2022.     PT IS HAVING CONSTANT NERVE ISSUES WITH LEFT LEG & BUTTOCKS. PT IS HAVING HARD TIME WITH WALKING AND SITTING. PAIN LEVEL TODAY 4/10.  PT IS TAKING MOTRIN WHICH IS ONLY PROVIDING PARTIAL RELIEF FOR HIS PAIN.    PLEASE CALL AND ADVISE    THANK YOU

## 2022-03-08 NOTE — TELEPHONE ENCOUNTER
Patient was last seen by Susan on 1/14/22. Patient contacted the office on 3/4/22 for c/o constant nerve issues with left leg and buttocks. He is having a hard time sitting and standing.

## 2022-03-09 RX ORDER — METHYLPREDNISOLONE 4 MG/1
TABLET ORAL
Qty: 21 TABLET | Refills: 0 | Status: SHIPPED | OUTPATIENT
Start: 2022-03-09 | End: 2022-03-22

## 2022-03-22 ENCOUNTER — OFFICE VISIT (OUTPATIENT)
Dept: NEUROSURGERY | Facility: CLINIC | Age: 77
End: 2022-03-22

## 2022-03-22 VITALS
TEMPERATURE: 97.8 F | WEIGHT: 218.26 LBS | HEART RATE: 63 BPM | OXYGEN SATURATION: 99 % | BODY MASS INDEX: 30.56 KG/M2 | SYSTOLIC BLOOD PRESSURE: 144 MMHG | HEIGHT: 71 IN | DIASTOLIC BLOOD PRESSURE: 90 MMHG

## 2022-03-22 DIAGNOSIS — M43.16 SPONDYLOLISTHESIS OF LUMBAR REGION: ICD-10-CM

## 2022-03-22 DIAGNOSIS — M48.062 SPINAL STENOSIS, LUMBAR REGION, WITH NEUROGENIC CLAUDICATION: Primary | ICD-10-CM

## 2022-03-22 DIAGNOSIS — R29.898 LEFT LEG WEAKNESS: ICD-10-CM

## 2022-03-22 PROCEDURE — 99214 OFFICE O/P EST MOD 30 MIN: CPT | Performed by: NURSE PRACTITIONER

## 2022-03-22 NOTE — PROGRESS NOTES
Subjective   Patient ID: Vinicius Casas is a 77 y.o. male is here today for follow-up of back pain and left leg and buttock pain. He was last seen in the office on 1/14/22 and was doing quite well. He was prescribed MDP for increase of symptoms on 3/9/22.  He notes no improvement in the worsening left leg pain since completing the steroids.      History of Present Illness     Today Mr. Casas reports he does not have any back pain. He reports consistent left lateral thigh, calf and ankle pain since May or June.  The pain was so severe as though the outer portion of the left foot ws severely injured or sprained.  He would look down at his foot and noticed that he had no bruising or swelling to indicate an injury.  He also gets a persistent shocking sensation in the left calf and ankle. He had a left hip replacement approximately 5 months ago and his symptoms felt somewhat better but have since worsened again over the last couple of months.  His left leg feels as though it is on fire.  Prolonged sitting worsens his symptoms as does prolonged standing.  He has been taking Motrin on a regular basis as well as Tylenol to help with the symptoms.  He also states that the pain is severe at times and then at other times seems a bit more tolerable.  He can no longer take it.  He feels that with his history of severe L4-5 stenosis, something needs to be done.  He has done physical therapy and injections in the past with little to no relief.    The following portions of the patient's history were reviewed and updated as appropriate: allergies, current medications, past family history, past medical history, past social history, past surgical history and problem list.    Review of Systems   Constitutional: Positive for activity change.   Cardiovascular: Positive for leg swelling (chronic, mild).   Musculoskeletal: Positive for back pain and myalgias.   Neurological: Positive for weakness and numbness.  "  Psychiatric/Behavioral: Negative for sleep disturbance.       Objective     Vitals:    03/22/22 1012   BP: 144/90   Pulse: 63   Temp: 97.8 °F (36.6 °C)   SpO2: 99%   Weight: 99 kg (218 lb 4.1 oz)   Height: 180.3 cm (71\")     Body mass index is 30.44 kg/m².      Physical Exam  Vitals reviewed.   Constitutional:       General: He is not in acute distress.     Appearance: Normal appearance. He is well-developed. He is not ill-appearing or diaphoretic.   HENT:      Head: Normocephalic and atraumatic.      Nose: Nose normal.   Eyes:      General:         Right eye: No discharge.         Left eye: No discharge.      Conjunctiva/sclera: Conjunctivae normal.   Neck:      Trachea: No tracheal deviation.   Cardiovascular:      Rate and Rhythm: Normal rate.   Pulmonary:      Effort: Pulmonary effort is normal. No respiratory distress.   Abdominal:      General: There is no distension.      Palpations: Abdomen is soft.      Tenderness: There is no abdominal tenderness.   Musculoskeletal:         General: No tenderness. Normal range of motion.      Cervical back: Normal range of motion and neck supple.      Right lower leg: Edema (mild) present.      Left lower leg: Edema (mild) present.   Skin:     General: Skin is warm and dry.      Findings: No erythema.   Neurological:      Mental Status: He is alert and oriented to person, place, and time.      GCS: GCS eye subscore is 4. GCS verbal subscore is 5. GCS motor subscore is 6.      Sensory: No sensory deficit.      Motor: No abnormal muscle tone.      Coordination: Coordination normal.      Deep Tendon Reflexes: Reflexes are normal and symmetric. Reflexes normal.      Comments: No motor deficits except for the left EHL which is 3+/5. Hypersensitive to light touch in the L leg. DTR's normal. Negative Olmos's; negative clonus. SLR positive on the left at 30 degrees. Significant difficulty with bearing weight on both heel and both toes.      Psychiatric:         Behavior: " Behavior is cooperative.         Thought Content: Thought content normal.         Assessment/Plan   Independent Review of Radiographic Studies:      I personally reviewed the images from the following studies.    Lumbar spine MRI from May 2021 revealed severe facet arthropathy with a rotational anterolisthesis of L4 on L5 by approximately 7 mm.  Severe canal narrowing at L4-5 with spinal fluid obliteration over the cauda equina.  Bilateral lateral recess narrowing also contributing to compression of the L5 nerve roots bilaterally.  There is some pressure of the left L4 nerve root with bulging disc material in the right foramen at L4-5.    Medical Decision Making:      Mr. Casas returns the office for worsening complaints as stated above.  When I saw him last, he had undergone left hip replacement surgery and felt quite a bit better in terms of the lumbar spine issues.  The patient is now having recurrent, severe episodes of left greater than right leg pain.  He has numbness and tingling but more so paresthesias sensations in the left leg as well as some left EHL weakness on exam.     Feel that there is little else to do other than to proceed with lumbar myelography for a full, complete assessment of the pathology in the lumbar spine.  He will also get a PA and lateral, flexion-extension x-rays due to the history of L4-5 anterolisthesis.  He will return to the office for reevaluation with Dr. Garrison thereafter.    I have discussed the myelogram procedure at length with the patient. The risks of the procedure were explained.  I explained that there is an approximate 50% chance of developing a bad headache which is usually positional.  There may also be some increased back pain at the insertion site for a 1-2 days after the procedure. For this reason I recommended avoidance of any strenuous activity, lying down as much as possible, drinking plenty of fluids, even caffeinated beverages for a couple of days  following the myelogram. There is also a small chance of infection, bleeding, or  seizures. Should the patient develop a positional headache, I recommended calling the office for further instructions. If symptoms do not improve with steroids and rest as stated above, the patient may require a blood patch. Should the patient develop any other untoward symptoms from the myelogram, a call to our office was recommended. The patient verbalized understanding of these risks and asked to proceed.       Diagnoses and all orders for this visit:    1. Spinal stenosis, lumbar region, with neurogenic claudication (Primary)  -     IR Myelogram Lumbar Spine; Future  -     CT Lumbar Spine With Intrathecal Contrast; Future  -     XR Spine Lumbar Complete With Flex & Ext; Future    2. Spondylolisthesis of lumbar region  -     IR Myelogram Lumbar Spine; Future  -     CT Lumbar Spine With Intrathecal Contrast; Future  -     XR Spine Lumbar Complete With Flex & Ext; Future    3. Left leg weakness  -     IR Myelogram Lumbar Spine; Future  -     CT Lumbar Spine With Intrathecal Contrast; Future  -     XR Spine Lumbar Complete With Flex & Ext; Future      Return for after radiographic imaging, Dr. Garrison.

## 2022-05-04 NOTE — PROGRESS NOTES
05/16/22 0002   Pre-Procedure Phone Call   Procedure Time Verified Yes   Arrival Time 1200   Procedure Location Verified Yes   Medical History Reviewed No   NPO Status Reinforced Yes   Ride and Caregiver Arranged Yes   Patient Knows to Bring Current Medications No   Bring Outside Films Requested No

## 2022-05-16 ENCOUNTER — HOSPITAL ENCOUNTER (OUTPATIENT)
Dept: GENERAL RADIOLOGY | Facility: HOSPITAL | Age: 77
Discharge: HOME | End: 2022-05-16

## 2022-05-16 ENCOUNTER — HOSPITAL ENCOUNTER (OUTPATIENT)
Dept: CT IMAGING | Facility: HOSPITAL | Age: 77
Discharge: HOME | End: 2022-05-16

## 2022-05-16 VITALS
HEIGHT: 71 IN | SYSTOLIC BLOOD PRESSURE: 148 MMHG | BODY MASS INDEX: 31.5 KG/M2 | DIASTOLIC BLOOD PRESSURE: 78 MMHG | HEART RATE: 80 BPM | WEIGHT: 225 LBS | OXYGEN SATURATION: 100 % | RESPIRATION RATE: 18 BRPM | TEMPERATURE: 98.2 F

## 2022-05-16 DIAGNOSIS — M48.062 SPINAL STENOSIS, LUMBAR REGION, WITH NEUROGENIC CLAUDICATION: ICD-10-CM

## 2022-05-16 DIAGNOSIS — M43.16 SPONDYLOLISTHESIS OF LUMBAR REGION: ICD-10-CM

## 2022-05-16 DIAGNOSIS — R29.898 LEFT LEG WEAKNESS: ICD-10-CM

## 2022-05-16 PROCEDURE — 72240 MYELOGRAPHY NECK SPINE: CPT

## 2022-05-16 PROCEDURE — 0 LIDOCAINE 1 % SOLUTION: Performed by: NURSE PRACTITIONER

## 2022-05-16 PROCEDURE — 62304 MYELOGRAPHY LUMBAR INJECTION: CPT

## 2022-05-16 PROCEDURE — 72132 CT LUMBAR SPINE W/DYE: CPT

## 2022-05-16 PROCEDURE — 0 IOPAMIDOL 41 % SOLUTION: Performed by: NURSE PRACTITIONER

## 2022-05-16 PROCEDURE — 72114 X-RAY EXAM L-S SPINE BENDING: CPT

## 2022-05-16 RX ORDER — SODIUM CHLORIDE 0.9 % (FLUSH) 0.9 %
3 SYRINGE (ML) INJECTION EVERY 12 HOURS SCHEDULED
Status: CANCELLED | OUTPATIENT
Start: 2022-05-16

## 2022-05-16 RX ORDER — SODIUM CHLORIDE 0.9 % (FLUSH) 0.9 %
10 SYRINGE (ML) INJECTION AS NEEDED
Status: CANCELLED | OUTPATIENT
Start: 2022-05-16

## 2022-05-16 RX ORDER — LIDOCAINE HYDROCHLORIDE 10 MG/ML
10 INJECTION, SOLUTION INFILTRATION; PERINEURAL ONCE
Status: COMPLETED | OUTPATIENT
Start: 2022-05-16 | End: 2022-05-16

## 2022-05-16 RX ADMIN — IOPAMIDOL 20 ML: 408 INJECTION, SOLUTION INTRATHECAL at 13:31

## 2022-05-16 RX ADMIN — LIDOCAINE HYDROCHLORIDE 2.5 ML: 10 INJECTION, SOLUTION INFILTRATION; PERINEURAL at 13:30

## 2022-05-16 NOTE — H&P
Name: Vinicius Casas ADMIT: 2022   : 1945  PCP: Pallares, Clara Ann, MD    MRN: [id number] LOS: 0 days   AGE/SEX: 77 y.o. male  ROOM: Room/bed info not found       Chief complaint   Patient is a 77 y.o. male presents for myelogram  Past Surgical History:  Past Surgical History:   Procedure Laterality Date   • PARTIAL HIP ARTHROPLASTY Left 10/08/2021   • SKIN BIOPSY         Past Medical History:  Past Medical History:   Diagnosis Date   • High blood pressure    • Melanoma (HCC)        Home Medications:  (Not in a hospital admission)      Allergies:  Patient has no known allergies.    Family History:  Family History   Problem Relation Age of Onset   • Pneumonia Father        Social History:  Social History     Tobacco Use   • Smoking status: Never Smoker   Substance Use Topics   • Alcohol use: Yes        Objective     Physical Exam:   Ok for myelogram    Vital Signs  Temp:  [98.2 °F (36.8 °C)] 98.2 °F (36.8 °C)  Heart Rate:  [82] 82  Resp:  [18] 18  BP: (196)/(85) 196/85    Anticipated Surgical Procedure  myelogram    The risks, benefits and alternatives of this procedure have been discussed with the patient or responsible party: Yes        Brendan Nicholson MD  22  13:40 EDT

## 2022-05-16 NOTE — NURSING NOTE
Patient out to car via wheelchair and RN assist. Patient's significant other picked him up. No issues or concerns noted.

## 2022-05-16 NOTE — DISCHARGE INSTRUCTIONS
EDUCATION /DISCHARGE INSTRUCTIONS:  A myelogram is a special radiology procedure of the spinal cord, spinal nerves and other related structures.  You will be awake during the examination.  An area of your lower back will be cleansed with an antiseptic solution.  The physician will inject a numbing medication in your lower back.  While your back is numb, a needle will be placed in the lower back area.  A small amount of spinal fluid may be withdrawn and sent to the lab if ordered by your physician. While the needle is in the back, an injection of a contrast material (xray dye) will be given through the needle.  The contrast material will allow the physician to see the spinal cord and spinal nerves.  Once injected, the needle will be removed and a band aid will be placed over the injection site.  The table will be tilted during the process to allow the contrast material to flow to particular areas in the spine.  Following the injection and xrays, you will be taken to the CT scan where more pictures will be taken. After the procedure is finished, the contrast material will be absorbed by your body and eliminated through your kidneys.  The radiologist will study and interpret your myelogram and send the results to your physician.  Procedure risks of a myelogram include, but are not limited to:  *  Bleeding   *  Seizure  *  Infection   *  Headache, possibly severe requiring a blood patch  *  Contrast reaction  *  Nerve or cord injury  *  Paralysis and death    Benefits of the procedure:    Best examination for delineating pathology related to spinal cord compression from a disc and/or nerve root compression  Alternatives to the procedure:MRI - a non invasive procedure requiring intravenous contrast injection. Cannot be done on patients with certain pacemakers or metal in the body.  MRI risks include possible reaction to the contrast material, movement of metal located in the body.   Benefit to MRI:  Non-invasive and  usually painless procedure.  THIS EDUCATION INFORMATION WAS REVIEWED PRIOR TO THE PROCEDURE AND CONSENT. Patient initials __________________Time_________________      Important information following your myelogram:    *  When you get home, lie down with no more than 2 pillows under your head.  *  Sit up in the car going home. At home, sit up to eat and use the restroom only, then lie back down.   *  24 HOURS COMPLETE AT ___________2pm Tuesday, May 17_____________   *  Tomorrow, after 24 hours completed, take it easy and rest the next 2-3 days.  *  Do not drive for 24 hours following a myelogram  *  You may remove the bandage and shower in the morning  *  Increase your fluids for the next 24 hours.  Caffeinated drinks are encouraged.Increase your intake of fluids the next 2-3 days        CALL YOUR PHYSICIAN FOR THE FOLLOWING:  * Pain at the injection site  * Redness, swelling, bruising or drainage at the injection site  * A fever by mouth of 101.0  * Any new symptoms  If you have problems with a headache that is not relieved with rest and medication, please call the Radiology Triage Nurse desk [phone]

## 2022-05-17 ENCOUNTER — TELEPHONE (OUTPATIENT)
Dept: INTERVENTIONAL RADIOLOGY/VASCULAR | Facility: HOSPITAL | Age: 77
End: 2022-05-17

## 2022-06-03 ENCOUNTER — TELEPHONE (OUTPATIENT)
Dept: NEUROSURGERY | Facility: CLINIC | Age: 77
End: 2022-06-03

## 2022-06-03 NOTE — TELEPHONE ENCOUNTER
I S/W PT TO CANCEL HIS APPT WITH DR ESCALANTE FOR 6/6/22 AT 10:45AM. PT ADVISED DR FARRAR IS OUT OF THE OFFICE SICK AND THAT WE WILL BE CONTACTING HIM TO RESCHEDULE. PT VOICED UNDERSTANDING.

## 2022-06-23 ENCOUNTER — TELEPHONE (OUTPATIENT)
Dept: NEUROSURGERY | Facility: CLINIC | Age: 77
End: 2022-06-23

## 2022-06-23 NOTE — TELEPHONE ENCOUNTER
PATIENT IS CALLING BACK TO RESCHEDULE WITH DR ESCALANTE, AS HE HAS NOT RECEIVED A CALL TO DO SO. PLEASE CALL PATIENT [phone] ASAP TO RESCHEDULE.

## 2022-07-14 ENCOUNTER — OFFICE VISIT (OUTPATIENT)
Dept: NEUROSURGERY | Facility: CLINIC | Age: 77
End: 2022-07-14

## 2022-07-14 VITALS
BODY MASS INDEX: 31.51 KG/M2 | HEART RATE: 72 BPM | HEIGHT: 71 IN | OXYGEN SATURATION: 100 % | TEMPERATURE: 97.8 F | DIASTOLIC BLOOD PRESSURE: 80 MMHG | WEIGHT: 225.09 LBS | SYSTOLIC BLOOD PRESSURE: 140 MMHG

## 2022-07-14 DIAGNOSIS — M43.16 SPONDYLOLISTHESIS OF LUMBAR REGION: ICD-10-CM

## 2022-07-14 DIAGNOSIS — M48.062 SPINAL STENOSIS, LUMBAR REGION, WITH NEUROGENIC CLAUDICATION: Primary | ICD-10-CM

## 2022-07-14 PROCEDURE — 99213 OFFICE O/P EST LOW 20 MIN: CPT | Performed by: NEUROLOGICAL SURGERY

## 2022-07-14 NOTE — PROGRESS NOTES
"Subjective   Patient ID: Vinicius Casas is a 77 y.o. male is here today for follow-up after myelogram.     Mr. Casas reports he has been taking motrin and tylenol which made him feel better. He was getting an \"electric current\" type pain in his legs but this is also better.     This gentleman has known spinal stenosis at L4-L5 with a spondylolisthesis.  I last saw him about 2-1/2 years ago.  He had been doing well and we kept it open-ended.  He has been seeing our nurse practitioners intermittently over the last few months because of some symptoms of numbness and tingling in the left leg and occasional left buttock and distal calf pain on the left.  But it resolves recently.  He works with a chiropractor and he takes Tylenol and Advil.  He has no motor deficits today.  He plays golf and he feels fine.  I told him that there is clearly nothing to do about this but symptoms could recur in which case he might need to have surgery at some point.  It he had surgery would probably be a decompression and fusion of L4-L5 but clearly there is nothing to do right now.  We will keep an eye on him and I will see him in 9 months to check on his motor exam.  Sooner if there is a flareup.        Back Pain  This is a chronic problem. The current episode started more than 1 year ago. The problem occurs intermittently. The problem has been waxing and waning since onset. The pain is present in the lumbar spine. The quality of the pain is described as aching and shooting. The pain radiates to the left thigh, left knee and left foot. The pain is at a severity of 1/10. The pain is mild. The symptoms are aggravated by position, standing, twisting and bending. Associated symptoms include leg pain and tingling. Pertinent negatives include no bladder incontinence, bowel incontinence, fever, numbness or weakness. He has tried analgesics and NSAIDs for the symptoms. The treatment provided moderate relief.       The following portions of " "the patient's history were reviewed and updated as appropriate: allergies, current medications, past family history, past medical history, past social history, past surgical history and problem list.    Review of Systems   Constitutional: Negative for activity change and fever.   Gastrointestinal: Negative for bowel incontinence.   Genitourinary: Negative for bladder incontinence.   Musculoskeletal: Positive for back pain.   Neurological: Positive for tingling. Negative for weakness and numbness.   Psychiatric/Behavioral: Negative for sleep disturbance.   All other systems reviewed and are negative.          Objective     Vitals:    07/14/22 1634   BP: 140/80   Pulse: 72   Temp: 97.8 °F (36.6 °C)   SpO2: 100%   Weight: 102 kg (225 lb 1.4 oz)   Height: 180.3 cm (71\")     Body mass index is 31.39 kg/m².      Physical Exam  Constitutional:       Appearance: He is well-developed.   HENT:      Head: Normocephalic and atraumatic.   Eyes:      Extraocular Movements: EOM normal.      Conjunctiva/sclera: Conjunctivae normal.      Pupils: Pupils are equal, round, and reactive to light.   Neck:      Vascular: No carotid bruit.   Neurological:      Mental Status: He is oriented to person, place, and time.      Coordination: Finger-Nose-Finger Test and Heel to Shin Test normal.      Gait: Gait is intact.      Deep Tendon Reflexes:      Reflex Scores:       Tricep reflexes are 2+ on the right side and 2+ on the left side.       Bicep reflexes are 2+ on the right side and 2+ on the left side.       Brachioradialis reflexes are 2+ on the right side and 2+ on the left side.       Patellar reflexes are 2+ on the right side and 2+ on the left side.       Achilles reflexes are 2+ on the right side and 2+ on the left side.  Psychiatric:         Speech: Speech normal.       Neurologic Exam     Mental Status   Oriented to person, place, and time.   Registration of memory: Good recent and remote memory.   Attention: normal. Concentration: " normal.   Speech: speech is normal   Level of consciousness: alert  Knowledge: consistent with education.     Cranial Nerves     CN II   Visual fields full to confrontation.   Visual acuity: normal    CN III, IV, VI   Pupils are equal, round, and reactive to light.  Extraocular motions are normal.     CN V   Facial sensation intact.   Right corneal reflex: normal  Left corneal reflex: normal    CN VII   Facial expression full, symmetric.   Right facial weakness: none  Left facial weakness: none    CN VIII   Hearing: intact    CN IX, X   Palate: symmetric    CN XI   Right sternocleidomastoid strength: normal  Left sternocleidomastoid strength: normal    CN XII   Tongue: not atrophic  Tongue deviation: none    Motor Exam   Muscle bulk: normal  Right arm tone: normal  Left arm tone: normal  Right leg tone: normal  Left leg tone: normal    Strength   Strength 5/5 except as noted.     Sensory Exam   Light touch normal.     Gait, Coordination, and Reflexes     Gait  Gait: normal    Coordination   Finger to nose coordination: normal  Heel to shin coordination: normal    Reflexes   Right brachioradialis: 2+  Left brachioradialis: 2+  Right biceps: 2+  Left biceps: 2+  Right triceps: 2+  Left triceps: 2+  Right patellar: 2+  Left patellar: 2+  Right achilles: 2+  Left achilles: 2+  Right : 2+  Left : 2+          Assessment & Plan   Independent Review of Radiographic Studies:      I personally reviewed the images from the following studies.    I reviewed his myelogram done on 5/16/2022 as well as his plain x-rays.  He has a spondylolisthesis of 8.4 mm that does not move in flexion extension.  It is at L4-L5.  He has severe spinal stenosis at L4-L5 with bilateral root entrapment.  Agree with the report.        Medical Decision Making:      He is doing well so 1 cannot argue with that.  We will keep an eye on him though and I will see him in 9 months, sooner if there is a bad flareup.      Diagnoses and all orders for  this visit:    1. Spinal stenosis, lumbar region, with neurogenic claudication (Primary)    2. Spondylolisthesis of lumbar region      Return in about 9 months (around 4/14/2023) for Face-to-face.

## 2023-03-30 NOTE — PROGRESS NOTES
Subjective   Patient ID: Vinicius Casas is a 78 y.o. male is here today for follow-up. Last OV 7/14/22    This very nice gentleman has radiographically severe spinal stenosis at L4-L5 with a spondylolisthesis.  He is really not had any episodes of severe pain although since he saw me he had 2 very brief episodes of a sensation of falling which did not actually happen but he felt that his legs gave way on him temporarily.  But otherwise he is functional.  He has a history of macular degeneration and is experiencing progressive visual loss.  He also says that he has been smelling strange odors.  He says there is a history of brain tumors in the family and he is concerned about that.  He has no double vision or headaches and no focal deficits.  I told him I doubt that he is has a tumor but we can certainly get a noncontrast head CT to reassure him.  We will do that and do a televisit.  If that is unremarkable we can probably keep it open-ended since he is not having any symptoms with regard to his stenosis itself.        History of Present Illness    The following portions of the patient's history were reviewed and updated as appropriate: allergies, current medications, past family history, past medical history, past social history, past surgical history and problem list.    Review of Systems   Constitutional: Negative for activity change, appetite change, fatigue, fever and unexpected weight change.   HENT: Negative.    Eyes: Positive for visual disturbance (Macular Degeneration). Negative for pain and redness.   Respiratory: Negative for apnea, cough, chest tightness, shortness of breath and wheezing.    Cardiovascular: Negative for chest pain, palpitations and leg swelling.   Gastrointestinal: Negative.    Endocrine: Negative for cold intolerance and heat intolerance.   Genitourinary: Negative.    Musculoskeletal: Positive for back pain, joint swelling, neck pain and neck stiffness. Negative for gait problem.  "  Skin: Negative for color change, rash and wound.   Allergic/Immunologic: Negative.    Neurological: Negative for dizziness, tremors, weakness, light-headedness, numbness and headaches.   Hematological: Does not bruise/bleed easily.   Psychiatric/Behavioral: Negative for agitation, behavioral problems, confusion, decreased concentration and sleep disturbance. The patient is not nervous/anxious and is not hyperactive.    All other systems reviewed and are negative.          Objective     Vitals:    04/12/23 0834   BP: 122/68   Pulse: 50   SpO2: 98%   Weight: 95.7 kg (211 lb)   Height: 180.3 cm (71\")     Body mass index is 29.43 kg/m².    Tobacco Use: Unknown   • Smoking Tobacco Use: Never   • Smokeless Tobacco Use: Unknown   • Passive Exposure: Not on file          Physical Exam  Constitutional:       Appearance: He is well-developed.   HENT:      Head: Normocephalic and atraumatic.   Eyes:      Extraocular Movements: EOM normal.      Conjunctiva/sclera: Conjunctivae normal.      Pupils: Pupils are equal, round, and reactive to light.   Neck:      Vascular: No carotid bruit.   Neurological:      Mental Status: He is oriented to person, place, and time.      Coordination: Finger-Nose-Finger Test and Heel to Shin Test normal.      Gait: Gait is intact.      Deep Tendon Reflexes:      Reflex Scores:       Tricep reflexes are 2+ on the right side and 2+ on the left side.       Bicep reflexes are 2+ on the right side and 2+ on the left side.       Brachioradialis reflexes are 2+ on the right side and 2+ on the left side.       Patellar reflexes are 2+ on the right side and 2+ on the left side.       Achilles reflexes are 2+ on the right side and 2+ on the left side.  Psychiatric:         Speech: Speech normal.       Neurologic Exam     Mental Status   Oriented to person, place, and time.   Registration of memory: Good recent and remote memory.   Attention: normal. Concentration: normal.   Speech: speech is normal   Level " of consciousness: alert  Knowledge: consistent with education.     Cranial Nerves     CN II   Visual fields full to confrontation.   Visual acuity: normal    CN III, IV, VI   Pupils are equal, round, and reactive to light.  Extraocular motions are normal.     CN V   Facial sensation intact.   Right corneal reflex: normal  Left corneal reflex: normal    CN VII   Facial expression full, symmetric.   Right facial weakness: none  Left facial weakness: none    CN VIII   Hearing: intact    CN IX, X   Palate: symmetric    CN XI   Right sternocleidomastoid strength: normal  Left sternocleidomastoid strength: normal    CN XII   Tongue: not atrophic  Tongue deviation: none    Motor Exam   Muscle bulk: normal  Right arm tone: normal  Left arm tone: normal  Right leg tone: normal  Left leg tone: normal    Strength   Strength 5/5 except as noted.     Sensory Exam   Light touch normal.     Gait, Coordination, and Reflexes     Gait  Gait: normal    Coordination   Finger to nose coordination: normal  Heel to shin coordination: normal    Reflexes   Right brachioradialis: 2+  Left brachioradialis: 2+  Right biceps: 2+  Left biceps: 2+  Right triceps: 2+  Left triceps: 2+  Right patellar: 2+  Left patellar: 2+  Right achilles: 2+  Left achilles: 2+  Right : 2+  Left : 2+          Assessment & Plan   Independent Review of Radiographic Studies:      I personally reviewed the images from the following studies.    I reviewed his myelogram done on 5/16/2022 as well as his plain x-rays.  He has a spondylolisthesis of 8.4 mm that does not move in flexion extension.  It is at L4-L5.  He has severe spinal stenosis at L4-L5 with bilateral root entrapment.  Agree with the report.    Medical Decision Making:      We will do a noncontrast head CT and do a televisit to discuss it.  It is unremarkable, as I suspect, he can be reassured.  Since he is not having much in the way of spinal stenosis symptoms, we could keep it open-ended after  that.  If he would like to continue see me once in the while that would be fine to.      Diagnoses and all orders for this visit:    1. Spinal stenosis, lumbar region, with neurogenic claudication (Primary)    2. Spondylolisthesis of lumbar region    3. Visual loss  -     CT Head Without Contrast; Future    4. History of falling  -     CT Head Without Contrast; Future      Return in about 4 weeks (around 5/10/2023) for As a televisit on a surgery day between cases..

## 2023-04-12 ENCOUNTER — OFFICE VISIT (OUTPATIENT)
Dept: NEUROSURGERY | Facility: CLINIC | Age: 78
End: 2023-04-12
Payer: MEDICARE

## 2023-04-12 VITALS
HEIGHT: 71 IN | HEART RATE: 50 BPM | WEIGHT: 211 LBS | OXYGEN SATURATION: 98 % | DIASTOLIC BLOOD PRESSURE: 68 MMHG | SYSTOLIC BLOOD PRESSURE: 122 MMHG | BODY MASS INDEX: 29.54 KG/M2

## 2023-04-12 DIAGNOSIS — M48.062 SPINAL STENOSIS, LUMBAR REGION, WITH NEUROGENIC CLAUDICATION: Primary | ICD-10-CM

## 2023-04-12 DIAGNOSIS — Z91.81 HISTORY OF FALLING: ICD-10-CM

## 2023-04-12 DIAGNOSIS — M43.16 SPONDYLOLISTHESIS OF LUMBAR REGION: ICD-10-CM

## 2023-04-12 DIAGNOSIS — H54.7 VISUAL LOSS: ICD-10-CM

## 2023-04-12 PROCEDURE — 1160F RVW MEDS BY RX/DR IN RCRD: CPT | Performed by: NEUROLOGICAL SURGERY

## 2023-04-12 PROCEDURE — 1159F MED LIST DOCD IN RCRD: CPT | Performed by: NEUROLOGICAL SURGERY

## 2023-04-12 PROCEDURE — 99213 OFFICE O/P EST LOW 20 MIN: CPT | Performed by: NEUROLOGICAL SURGERY

## 2023-04-12 RX ORDER — DORZOLAMIDE HYDROCHLORIDE AND TIMOLOL MALEATE 20; 5 MG/ML; MG/ML
SOLUTION/ DROPS OPHTHALMIC
COMMUNITY
Start: 2023-04-01

## 2023-04-12 RX ORDER — METOPROLOL SUCCINATE 25 MG/1
1 TABLET, EXTENDED RELEASE ORAL DAILY
COMMUNITY
Start: 2023-02-01

## 2023-04-12 RX ORDER — LATANOPROST 50 UG/ML
SOLUTION/ DROPS OPHTHALMIC
COMMUNITY
Start: 2023-03-23

## 2023-05-05 ENCOUNTER — HOSPITAL ENCOUNTER (OUTPATIENT)
Dept: CT IMAGING | Facility: HOSPITAL | Age: 78
Discharge: HOME | End: 2023-05-05
Payer: MEDICARE

## 2023-05-05 DIAGNOSIS — Z91.81 HISTORY OF FALLING: ICD-10-CM

## 2023-05-05 DIAGNOSIS — H54.7 VISUAL LOSS: ICD-10-CM

## 2023-05-05 PROCEDURE — 70450 CT HEAD/BRAIN W/O DYE: CPT

## 2023-05-09 NOTE — PROGRESS NOTES
Subjective   Patient ID: Vinicius Casas is a 78 y.o. male is here today for follow-up after head CT. You have chosen to receive care through a telephone visit. Do you consent to use a telephone visit for your medical care today? Yes    Unable to complete visit using a video connection to the patient. A phone visit was used to complete this visits. Total time of discussion was 11 minutes.    I was calling from the office.  The patient was at home.  We discussed the CT scan which did not show any obvious mass.  I did not think an MRI was necessary.  These episodes of visual problems and smell sensations are intermittent.  They are not progressive.  He was concerned because there was a history of tumors in the family but we simply did not find that.  His lumbar stenosis is relatively inactive right now but he wants to remain an ongoing patient in case he has some symptoms which is reasonable since my wait list is quite long for new patients.  So we will arrange for him to come back and see me in 10 months.  He will let us know if he has recurrent sciatica in the meantime.      History of Present Illness    The following portions of the patient's history were reviewed and updated as appropriate: allergies, current medications, past family history, past medical history, past social history, past surgical history and problem list.    Review of Systems        Objective     There were no vitals filed for this visit.  There is no height or weight on file to calculate BMI.    Tobacco Use: Unknown   • Smoking Tobacco Use: Never   • Smokeless Tobacco Use: Unknown   • Passive Exposure: Not on file          Physical Exam  Neurologic Exam        Assessment & Plan   Independent Review of Radiographic Studies:      I personally reviewed the images from the following studies.    The CT scan done on 5/5/2023 just show some nonspecific small vessel disease but no evidence of any mass or infarct.    I reviewed his myelogram done on  5/16/2022 as well as his plain x-rays.  He has a spondylolisthesis of 8.4 mm that does not move in flexion extension.  It is at L4-L5.  He has severe spinal stenosis at L4-L5 with bilateral root entrapment.  Agree with the report.    Medical Decision Making:      We will make a follow-up visit for him in 10 months.  If symptoms flareup in the low back between now and then, he will let us know.        Diagnoses and all orders for this visit:    1. Spinal stenosis, lumbar region, with neurogenic claudication (Primary)    2. Spondylolisthesis of lumbar region    3. Visual loss    4. History of falling    5. Left leg weakness      Return in about 10 months (around 3/11/2024) for Face to face.

## 2023-05-11 ENCOUNTER — TELEPHONE (OUTPATIENT)
Dept: NEUROSURGERY | Facility: CLINIC | Age: 78
End: 2023-05-11

## 2023-05-11 ENCOUNTER — OFFICE VISIT (OUTPATIENT)
Dept: NEUROSURGERY | Facility: CLINIC | Age: 78
End: 2023-05-11
Payer: MEDICARE

## 2023-05-11 DIAGNOSIS — H54.7 VISUAL LOSS: ICD-10-CM

## 2023-05-11 DIAGNOSIS — R29.898 LEFT LEG WEAKNESS: ICD-10-CM

## 2023-05-11 DIAGNOSIS — Z91.81 HISTORY OF FALLING: ICD-10-CM

## 2023-05-11 DIAGNOSIS — M48.062 SPINAL STENOSIS, LUMBAR REGION, WITH NEUROGENIC CLAUDICATION: Primary | ICD-10-CM

## 2023-05-11 DIAGNOSIS — M43.16 SPONDYLOLISTHESIS OF LUMBAR REGION: ICD-10-CM

## 2023-05-11 PROCEDURE — 1159F MED LIST DOCD IN RCRD: CPT | Performed by: NEUROLOGICAL SURGERY

## 2023-05-11 PROCEDURE — 1160F RVW MEDS BY RX/DR IN RCRD: CPT | Performed by: NEUROLOGICAL SURGERY

## 2023-05-11 PROCEDURE — 99442 PR PHYS/QHP TELEPHONE EVALUATION 11-20 MIN: CPT | Performed by: NEUROLOGICAL SURGERY

## 2023-06-12 ENCOUNTER — TRANSCRIBE ORDERS (OUTPATIENT)
Dept: ADMINISTRATIVE | Facility: HOSPITAL | Age: 78
End: 2023-06-12
Payer: MEDICARE

## 2023-06-12 DIAGNOSIS — R22.31 MASS OF FINGER OF RIGHT HAND: Primary | ICD-10-CM

## 2023-08-02 ENCOUNTER — HOSPITAL ENCOUNTER (OUTPATIENT)
Dept: MRI IMAGING | Facility: HOSPITAL | Age: 78
Discharge: HOME | End: 2023-08-02
Admitting: STUDENT IN AN ORGANIZED HEALTH CARE EDUCATION/TRAINING PROGRAM
Payer: MEDICARE

## 2023-08-02 DIAGNOSIS — R22.31 MASS OF FINGER OF RIGHT HAND: ICD-10-CM

## 2023-08-02 PROCEDURE — 82565 ASSAY OF CREATININE: CPT

## 2023-08-02 PROCEDURE — 0 GADOBENATE DIMEGLUMINE 529 MG/ML SOLUTION: Performed by: STUDENT IN AN ORGANIZED HEALTH CARE EDUCATION/TRAINING PROGRAM

## 2023-08-02 PROCEDURE — A9577 INJ MULTIHANCE: HCPCS | Performed by: STUDENT IN AN ORGANIZED HEALTH CARE EDUCATION/TRAINING PROGRAM

## 2023-08-02 PROCEDURE — 73220 MRI UPPR EXTREMITY W/O&W/DYE: CPT

## 2023-08-02 RX ADMIN — GADOBENATE DIMEGLUMINE 20 ML: 529 INJECTION, SOLUTION INTRAVENOUS at 16:47

## 2023-08-03 LAB — CREAT BLDA-MCNC: 1 MG/DL (ref 0.6–1.3)

## 2024-03-07 NOTE — PROGRESS NOTES
"Subjective   Patient ID: Vinicius Casas is a 79 y.o. male is here today for follow-up on severe spinal stenosis at L4-L5 with bilateral root entrapment     This gentleman has severe radiographic lumbar stenosis with a spondylolisthesis at L4-L5.  His symptoms consist intermittent sense of weakness in his legs but it is very transient and it does not affect his sense of functionality.  He can walk and he has no motor deficit.  I told him that sometimes the symptoms progress and cause more severe pain in which case we can try an epidural block which she has never had before.  But he is functional and so we will simply keep an eye on him and see him in 1 year.  If things worsen, he will let us know.        History of Present Illness    The following portions of the patient's history were reviewed and updated as appropriate: allergies, current medications, past family history, past medical history, past social history, past surgical history, and problem list.    Review of Systems   Constitutional:  Negative for fever.   Musculoskeletal:  Negative for back pain and gait problem.   Neurological:  Positive for weakness. Negative for numbness.   All other systems reviewed and are negative.          Objective     Vitals:    03/11/24 1112   BP: 124/50   BP Location: Left arm   Patient Position: Sitting   Cuff Size: Adult   Resp: 20   Weight: 95.3 kg (210 lb)   Height: 180.3 cm (70.98\")   PainSc: 0-No pain     Body mass index is 29.3 kg/m².    Tobacco Use: Unknown (3/11/2024)    Patient History     Smoking Tobacco Use: Never     Smokeless Tobacco Use: Unknown     Passive Exposure: Not on file          Physical Exam  Constitutional:       Appearance: He is well-developed.   HENT:      Head: Normocephalic and atraumatic.   Eyes:      Extraocular Movements: EOM normal.      Conjunctiva/sclera: Conjunctivae normal.      Pupils: Pupils are equal, round, and reactive to light.   Neck:      Vascular: No carotid bruit. "   Neurological:      Mental Status: He is oriented to person, place, and time.      Coordination: Finger-Nose-Finger Test and Heel to Shin Test normal.      Gait: Gait is intact.      Deep Tendon Reflexes:      Reflex Scores:       Tricep reflexes are 2+ on the right side and 2+ on the left side.       Bicep reflexes are 2+ on the right side and 2+ on the left side.       Brachioradialis reflexes are 2+ on the right side and 2+ on the left side.       Patellar reflexes are 2+ on the right side and 2+ on the left side.       Achilles reflexes are 2+ on the right side and 2+ on the left side.  Psychiatric:         Speech: Speech normal.       Neurologic Exam     Mental Status   Oriented to person, place, and time.   Registration of memory: Good recent and remote memory.   Attention: normal. Concentration: normal.   Speech: speech is normal   Level of consciousness: alert  Knowledge: consistent with education.     Cranial Nerves     CN II   Visual fields full to confrontation.   Visual acuity: normal    CN III, IV, VI   Pupils are equal, round, and reactive to light.  Extraocular motions are normal.     CN V   Facial sensation intact.   Right corneal reflex: normal  Left corneal reflex: normal    CN VII   Facial expression full, symmetric.   Right facial weakness: none  Left facial weakness: none    CN VIII   Hearing: intact    CN IX, X   Palate: symmetric    CN XI   Right sternocleidomastoid strength: normal  Left sternocleidomastoid strength: normal    CN XII   Tongue: not atrophic  Tongue deviation: none    Motor Exam   Muscle bulk: normal  Right arm tone: normal  Left arm tone: normal  Right leg tone: normal  Left leg tone: normal    Strength   Strength 5/5 except as noted.     Sensory Exam   Light touch normal.     Gait, Coordination, and Reflexes     Gait  Gait: normal    Coordination   Finger to nose coordination: normal  Heel to shin coordination: normal    Reflexes   Right brachioradialis: 2+  Left  brachioradialis: 2+  Right biceps: 2+  Left biceps: 2+  Right triceps: 2+  Left triceps: 2+  Right patellar: 2+  Left patellar: 2+  Right achilles: 2+  Left achilles: 2+  Right : 2+  Left : 2+          Assessment & Plan   Independent Review of Radiographic Studies:      I personally reviewed the images from the following studies.    I reviewed his myelogram done on 5/16/2022 as well as his plain x-rays.  He has a spondylolisthesis of 8.4 mm that does not move in flexion extension.  It is at L4-L5.  He has severe spinal stenosis at L4-L5 with bilateral root entrapment.  Agree with the report.       Medical Decision Making:      We will keep an eye on him and I will see him in 1 year.  If his symptoms progress and in particular if they cause a lot of severe back and leg pain, he will let us know and we can consider epidural blocks.    Diagnoses and all orders for this visit:    1. Spinal stenosis, lumbar region, with neurogenic claudication (Primary)    2. Spondylolisthesis at L4-L5 level    3. DDD (degenerative disc disease), lumbar      Return in about 1 year (around 3/11/2025) for face to face.

## 2024-03-11 ENCOUNTER — OFFICE VISIT (OUTPATIENT)
Dept: NEUROSURGERY | Facility: CLINIC | Age: 79
End: 2024-03-11
Payer: MEDICARE

## 2024-03-11 VITALS
HEIGHT: 71 IN | SYSTOLIC BLOOD PRESSURE: 124 MMHG | DIASTOLIC BLOOD PRESSURE: 50 MMHG | WEIGHT: 210 LBS | BODY MASS INDEX: 29.4 KG/M2 | RESPIRATION RATE: 20 BRPM

## 2024-03-11 DIAGNOSIS — M48.062 SPINAL STENOSIS, LUMBAR REGION, WITH NEUROGENIC CLAUDICATION: Primary | ICD-10-CM

## 2024-03-11 DIAGNOSIS — M51.36 DDD (DEGENERATIVE DISC DISEASE), LUMBAR: ICD-10-CM

## 2024-03-11 DIAGNOSIS — M43.16 SPONDYLOLISTHESIS AT L4-L5 LEVEL: ICD-10-CM

## 2024-03-11 PROBLEM — M51.369 DDD (DEGENERATIVE DISC DISEASE), LUMBAR: Status: ACTIVE | Noted: 2024-03-11

## 2024-03-11 PROCEDURE — 99213 OFFICE O/P EST LOW 20 MIN: CPT | Performed by: NEUROLOGICAL SURGERY

## 2024-03-11 PROCEDURE — 1159F MED LIST DOCD IN RCRD: CPT | Performed by: NEUROLOGICAL SURGERY

## 2024-03-11 PROCEDURE — 1160F RVW MEDS BY RX/DR IN RCRD: CPT | Performed by: NEUROLOGICAL SURGERY

## 2024-04-18 ENCOUNTER — TRANSCRIBE ORDERS (OUTPATIENT)
Dept: ADMINISTRATIVE | Facility: HOSPITAL | Age: 79
End: 2024-04-18
Payer: MEDICARE

## 2024-04-18 ENCOUNTER — HOSPITAL ENCOUNTER (OUTPATIENT)
Dept: CARDIOLOGY | Facility: HOSPITAL | Age: 79
Discharge: HOME | End: 2024-04-18
Admitting: OTOLARYNGOLOGY
Payer: MEDICARE

## 2024-04-18 DIAGNOSIS — J32.4 CHRONIC PANSINUSITIS: ICD-10-CM

## 2024-04-18 DIAGNOSIS — Z01.818 OTHER SPECIFIED PRE-OPERATIVE EXAMINATION: ICD-10-CM

## 2024-04-18 DIAGNOSIS — Z01.818 OTHER SPECIFIED PRE-OPERATIVE EXAMINATION: Primary | ICD-10-CM

## 2024-04-18 PROCEDURE — 93005 ELECTROCARDIOGRAM TRACING: CPT | Performed by: OTOLARYNGOLOGY

## 2024-04-19 LAB
QT INTERVAL: 468 MS
QTC INTERVAL: 414 MS

## 2025-03-14 NOTE — PROGRESS NOTES
Subjective   Patient ID: Vinicius Casas is a 80 y.o. male is here today for 1 year follow-up.    This is a surveillance visit for a gentleman with severe spinal stenosis at L4-L5 with a spondylolisthesis.  It has been a year since have seen him.  He has been generally doing well with no significant pain or weakness.  No motor deficits.  He has been doing walking and mild resistance exercises and hyperbaric oxygen therapy which he thinks seems to help.  He is actually doing quite well.  But we will continue another surveillance visit and see him in 1 year with x-rays.        History of Present Illness    The following portions of the patient's history were reviewed and updated as appropriate: allergies, current medications, past family history, past medical history, past social history, past surgical history, and problem list.    Review of Systems   All other systems reviewed and are negative.      Objective   Physical Exam  Constitutional:       General: He is awake.      Appearance: He is well-developed.   HENT:      Head: Normocephalic and atraumatic.   Eyes:      General: Lids are normal.      Extraocular Movements: Extraocular movements intact.      Conjunctiva/sclera: Conjunctivae normal.      Pupils: Pupils are equal, round, and reactive to light.   Neck:      Vascular: No carotid bruit.   Neurological:      Mental Status: He is alert.      Coordination: Coordination is intact.      Deep Tendon Reflexes:      Reflex Scores:       Tricep reflexes are 2+ on the right side and 2+ on the left side.       Bicep reflexes are 2+ on the right side and 2+ on the left side.       Brachioradialis reflexes are 2+ on the right side and 2+ on the left side.       Patellar reflexes are 2+ on the right side and 2+ on the left side.       Achilles reflexes are 2+ on the right side and 2+ on the left side.  Psychiatric:         Speech: Speech normal.       Neurological Exam  Mental Status  Awake and alert. Oriented only to  person, place, time and situation. Recent and remote memory are intact. Speech is normal. Language is fluent with no aphasia. Attention and concentration are normal. Fund of knowledge is appropriate for level of education.    Cranial Nerves  CN II: Visual acuity is normal. Visual fields full to confrontation.  CN III, IV, VI: Extraocular movements intact bilaterally. Normal lids and orbits bilaterally. Pupils equal round and reactive to light bilaterally.  CN V: Facial sensation is normal.  CN VII: Full and symmetric facial movement.  CN IX, X: Palate elevates symmetrically. Normal gag reflex.  CN XI: Shoulder shrug strength is normal.  CN XII: Tongue midline without atrophy or fasciculations.    Motor  Normal muscle bulk throughout. Normal muscle tone.                                               Right                     Left  Rhomboids                            5                          5  Infraspinatus                          5                          5  Supraspinatus                       5                          5  Deltoid                                   5                          5   Biceps                                   5                          5  Brachioradialis                      5                          5   Triceps                                  5                          5   Pronator                                5                          5   Supinator                              5                           5   Wrist flexor                            5                          5   Wrist extensor                       5                          5   Finger flexor                          5                          5   Finger extensor                     5                          5   Interossei                              5                          5   Abductor pollicis brevis         5                          5   Flexor pollicis brevis             5                          5    Opponens pollicis                 5                          5  Extensor digitorum               5                          5  Abductor digiti minimi           5                          5   Abdominal                            5                          5  Glutei                                    5                          5  Hip abductor                         5                          5  Hip adductor                         5                          5   Iliopsoas                               5                          5   Quadriceps                           5                          5   Hamstring                             5                          5   Gastrocnemius                     5                           5   Anterior tibialis                      5                          5   Posterior tibialis                    5                          5   Peroneal                               5                          5  Ankle dorsiflexor                   5                          5  Ankle plantar flexor              5                           5  Extensor hallucis longus      5                           5    Sensory  Light touch is normal in upper and lower extremities. Proprioception is normal in upper and lower extremities.     Reflexes                                            Right                      Left  Brachioradialis                    2+                         2+  Biceps                                 2+                         2+  Triceps                                2+                         2+  Finger flex                           2+                         2+  Hamstring                            2+                         2+  Patellar                                2+                         2+  Achilles                                2+                         2+    Coordination    Finger-to-nose, rapid alternating movements and heel-to-shin normal bilaterally without  dysmetria.    Gait  Casual gait is normal including stance, stride, and arm swing.Normal toe walking. Normal heel walking. Normal tandem gait.       Assessment & Plan   Independent Review of Radiographic Studies:      I reviewed his myelogram done on 5/16/2022 as well as his plain x-rays.  He has a spondylolisthesis of 8.4 mm that does not move in flexion extension.  It is at L4-L5.  He has severe spinal stenosis at L4-L5 with bilateral root entrapment.  Agree with the report.     Medical Decision Making:      Overall doing well.  Will continue to follow him and see in 1 year with x-rays.  If symptoms such as pain or weakness occur between now and then, he will let us know.      Diagnoses and all orders for this visit:    1. Spinal stenosis, lumbar region, with neurogenic claudication (Primary)  -     XR Spine Lumbar Complete With Flex & Ext; Future    2. Spondylolisthesis at L4-L5 level  -     XR Spine Lumbar Complete With Flex & Ext; Future    3. Degeneration of intervertebral disc of lumbar region, unspecified whether pain present  -     XR Spine Lumbar Complete With Flex & Ext; Future      Return in about 1 year (around 3/17/2026) for face to face.

## 2025-03-17 ENCOUNTER — OFFICE VISIT (OUTPATIENT)
Dept: NEUROSURGERY | Facility: CLINIC | Age: 80
End: 2025-03-17
Payer: MEDICARE

## 2025-03-17 VITALS — DIASTOLIC BLOOD PRESSURE: 62 MMHG | HEART RATE: 56 BPM | SYSTOLIC BLOOD PRESSURE: 122 MMHG | OXYGEN SATURATION: 98 %

## 2025-03-17 DIAGNOSIS — M51.369 DEGENERATION OF INTERVERTEBRAL DISC OF LUMBAR REGION, UNSPECIFIED WHETHER PAIN PRESENT: ICD-10-CM

## 2025-03-17 DIAGNOSIS — M43.16 SPONDYLOLISTHESIS AT L4-L5 LEVEL: ICD-10-CM

## 2025-03-17 DIAGNOSIS — M48.062 SPINAL STENOSIS, LUMBAR REGION, WITH NEUROGENIC CLAUDICATION: Primary | ICD-10-CM

## 2025-03-17 PROCEDURE — 1159F MED LIST DOCD IN RCRD: CPT | Performed by: NEUROLOGICAL SURGERY

## 2025-03-17 PROCEDURE — 99212 OFFICE O/P EST SF 10 MIN: CPT | Performed by: NEUROLOGICAL SURGERY

## 2025-03-17 PROCEDURE — 1160F RVW MEDS BY RX/DR IN RCRD: CPT | Performed by: NEUROLOGICAL SURGERY

## 2025-03-17 RX ORDER — ALLOPURINOL 100 MG/1
100 TABLET ORAL DAILY
COMMUNITY

## 2025-03-17 RX ORDER — BIMATOPROST 0.3 MG/ML
SOLUTION/ DROPS OPHTHALMIC
COMMUNITY

## 2025-03-17 RX ORDER — TAMSULOSIN HYDROCHLORIDE 0.4 MG/1
1 CAPSULE ORAL 2 TIMES DAILY
COMMUNITY